# Patient Record
Sex: MALE | Race: WHITE | NOT HISPANIC OR LATINO | URBAN - METROPOLITAN AREA
[De-identification: names, ages, dates, MRNs, and addresses within clinical notes are randomized per-mention and may not be internally consistent; named-entity substitution may affect disease eponyms.]

---

## 2018-04-11 ENCOUNTER — EMERGENCY (EMERGENCY)
Facility: HOSPITAL | Age: 38
LOS: 1 days | Discharge: HOME | End: 2018-04-11
Attending: EMERGENCY MEDICINE | Admitting: EMERGENCY MEDICINE
Payer: COMMERCIAL

## 2018-04-11 ENCOUNTER — RESULT REVIEW (OUTPATIENT)
Age: 38
End: 2018-04-11

## 2018-04-11 ENCOUNTER — INPATIENT (INPATIENT)
Facility: HOSPITAL | Age: 38
LOS: 10 days | Discharge: HOME | DRG: 219 | End: 2018-04-22
Attending: THORACIC SURGERY (CARDIOTHORACIC VASCULAR SURGERY) | Admitting: THORACIC SURGERY (CARDIOTHORACIC VASCULAR SURGERY)
Payer: COMMERCIAL

## 2018-04-11 ENCOUNTER — APPOINTMENT (OUTPATIENT)
Dept: CARDIOTHORACIC SURGERY | Facility: HOSPITAL | Age: 38
End: 2018-04-11

## 2018-04-11 VITALS
HEART RATE: 75 BPM | SYSTOLIC BLOOD PRESSURE: 149 MMHG | DIASTOLIC BLOOD PRESSURE: 83 MMHG | WEIGHT: 209.44 LBS | RESPIRATION RATE: 44 BRPM | HEIGHT: 70 IN | OXYGEN SATURATION: 94 %

## 2018-04-11 VITALS
RESPIRATION RATE: 18 BRPM | SYSTOLIC BLOOD PRESSURE: 104 MMHG | TEMPERATURE: 99 F | DIASTOLIC BLOOD PRESSURE: 51 MMHG | OXYGEN SATURATION: 99 % | HEART RATE: 63 BPM

## 2018-04-11 VITALS
HEART RATE: 70 BPM | RESPIRATION RATE: 18 BRPM | SYSTOLIC BLOOD PRESSURE: 135 MMHG | TEMPERATURE: 98 F | OXYGEN SATURATION: 100 %

## 2018-04-11 DIAGNOSIS — I72.9 ANEURYSM OF UNSPECIFIED SITE: ICD-10-CM

## 2018-04-11 DIAGNOSIS — I71.03 DISSECTION OF THORACOABDOMINAL AORTA: ICD-10-CM

## 2018-04-11 PROBLEM — Z00.00 ENCOUNTER FOR PREVENTIVE HEALTH EXAMINATION: Status: ACTIVE | Noted: 2018-04-11

## 2018-04-11 LAB
ALBUMIN SERPL ELPH-MCNC: 3.8 G/DL — SIGNIFICANT CHANGE UP (ref 3.3–5)
ALBUMIN SERPL ELPH-MCNC: 4.1 G/DL — SIGNIFICANT CHANGE UP (ref 3.3–5)
ALBUMIN SERPL ELPH-MCNC: 4.2 G/DL — SIGNIFICANT CHANGE UP (ref 3.3–5)
ALP SERPL-CCNC: 26 U/L — LOW (ref 40–120)
ALP SERPL-CCNC: 37 U/L — LOW (ref 40–120)
ALP SERPL-CCNC: 39 U/L — LOW (ref 40–120)
ALT FLD-CCNC: 22 U/L RC — SIGNIFICANT CHANGE UP (ref 10–45)
ALT FLD-CCNC: 28 U/L RC — SIGNIFICANT CHANGE UP (ref 10–45)
ALT FLD-CCNC: 31 U/L — SIGNIFICANT CHANGE UP (ref 4–41)
AMYLASE P1 CFR SERPL: 84 U/L — SIGNIFICANT CHANGE UP (ref 25–125)
ANION GAP SERPL CALC-SCNC: 17 MMOL/L — SIGNIFICANT CHANGE UP (ref 5–17)
ANION GAP SERPL CALC-SCNC: 18 MMOL/L — HIGH (ref 5–17)
APAP SERPL-MCNC: < 15 UG/ML — LOW (ref 15–25)
APTT BLD: 29 SEC — SIGNIFICANT CHANGE UP (ref 27.5–37.4)
APTT BLD: 30.3 SEC — SIGNIFICANT CHANGE UP (ref 27.5–37.4)
APTT BLD: 30.5 SEC — SIGNIFICANT CHANGE UP (ref 27.5–37.4)
AST SERPL-CCNC: 24 U/L — SIGNIFICANT CHANGE UP (ref 10–40)
AST SERPL-CCNC: 26 U/L — SIGNIFICANT CHANGE UP (ref 4–40)
AST SERPL-CCNC: 55 U/L — HIGH (ref 10–40)
BASE EXCESS BLDV CALC-SCNC: -4.5 MMOL/L — LOW (ref -2–2)
BASE EXCESS BLDV CALC-SCNC: 2.8 MMOL/L — SIGNIFICANT CHANGE UP
BASOPHILS # BLD AUTO: 0 K/UL — SIGNIFICANT CHANGE UP (ref 0–0.2)
BASOPHILS # BLD AUTO: 0.07 K/UL — SIGNIFICANT CHANGE UP (ref 0–0.2)
BASOPHILS NFR BLD AUTO: 0.2 % — SIGNIFICANT CHANGE UP (ref 0–2)
BASOPHILS NFR BLD AUTO: 0.4 % — SIGNIFICANT CHANGE UP (ref 0–2)
BILIRUB SERPL-MCNC: 0.3 MG/DL — SIGNIFICANT CHANGE UP (ref 0.2–1.2)
BILIRUB SERPL-MCNC: 0.4 MG/DL — SIGNIFICANT CHANGE UP (ref 0.2–1.2)
BILIRUB SERPL-MCNC: 0.8 MG/DL — SIGNIFICANT CHANGE UP (ref 0.2–1.2)
BLD GP AB SCN SERPL QL: NEGATIVE — SIGNIFICANT CHANGE UP
BLD GP AB SCN SERPL QL: NEGATIVE — SIGNIFICANT CHANGE UP
BLOOD GAS VENOUS - CREATININE: 1.09 MG/DL — SIGNIFICANT CHANGE UP (ref 0.5–1.3)
BLOOD GAS VENOUS - CREATININE: SIGNIFICANT CHANGE UP MG/DL (ref 0.5–1.3)
BUN SERPL-MCNC: 15 MG/DL — SIGNIFICANT CHANGE UP (ref 7–23)
BUN SERPL-MCNC: 17 MG/DL — SIGNIFICANT CHANGE UP (ref 7–23)
BUN SERPL-MCNC: 17 MG/DL — SIGNIFICANT CHANGE UP (ref 7–23)
CA-I SERPL-SCNC: 0.8 MMOL/L — LOW (ref 1.12–1.3)
CALCIUM SERPL-MCNC: 7.5 MG/DL — LOW (ref 8.4–10.5)
CALCIUM SERPL-MCNC: 8.8 MG/DL — SIGNIFICANT CHANGE UP (ref 8.4–10.5)
CALCIUM SERPL-MCNC: 9.1 MG/DL — SIGNIFICANT CHANGE UP (ref 8.4–10.5)
CHLORIDE BLDV-SCNC: 105 MMOL/L — SIGNIFICANT CHANGE UP (ref 96–108)
CHLORIDE BLDV-SCNC: SIGNIFICANT CHANGE UP MMOL/L (ref 96–108)
CHLORIDE SERPL-SCNC: 101 MMOL/L — SIGNIFICANT CHANGE UP (ref 96–108)
CHLORIDE SERPL-SCNC: 104 MMOL/L — SIGNIFICANT CHANGE UP (ref 96–108)
CHLORIDE SERPL-SCNC: 98 MMOL/L — SIGNIFICANT CHANGE UP (ref 98–107)
CK MB BLD-MCNC: 9.9 % — HIGH (ref 0–3.5)
CK MB CFR SERPL CALC: 25.1 NG/ML — HIGH (ref 0–6.7)
CK SERPL-CCNC: 254 U/L — HIGH (ref 30–200)
CO2 SERPL-SCNC: 21 MMOL/L — LOW (ref 22–31)
CO2 SERPL-SCNC: 22 MMOL/L — SIGNIFICANT CHANGE UP (ref 22–31)
CO2 SERPL-SCNC: 23 MMOL/L — SIGNIFICANT CHANGE UP (ref 22–31)
CREAT SERPL-MCNC: 0.92 MG/DL — SIGNIFICANT CHANGE UP (ref 0.5–1.3)
CREAT SERPL-MCNC: 0.99 MG/DL — SIGNIFICANT CHANGE UP (ref 0.5–1.3)
CREAT SERPL-MCNC: 1.13 MG/DL — SIGNIFICANT CHANGE UP (ref 0.5–1.3)
EOSINOPHIL # BLD AUTO: 0 K/UL — SIGNIFICANT CHANGE UP (ref 0–0.5)
EOSINOPHIL # BLD AUTO: 0.18 K/UL — SIGNIFICANT CHANGE UP (ref 0–0.5)
EOSINOPHIL NFR BLD AUTO: 0.3 % — SIGNIFICANT CHANGE UP (ref 0–6)
EOSINOPHIL NFR BLD AUTO: 1.1 % — SIGNIFICANT CHANGE UP (ref 0–6)
ETHANOL BLD-MCNC: < 10 MG/DL — SIGNIFICANT CHANGE UP
FIBRINOGEN PPP-MCNC: 279 MG/DL — LOW (ref 310–510)
GAS PNL BLDA: SIGNIFICANT CHANGE UP
GAS PNL BLDV: 136 MMOL/L — SIGNIFICANT CHANGE UP (ref 136–146)
GAS PNL BLDV: 140 MMOL/L — SIGNIFICANT CHANGE UP (ref 136–145)
GAS PNL BLDV: SIGNIFICANT CHANGE UP
GAS PNL BLDV: SIGNIFICANT CHANGE UP
GLUCOSE BLDV-MCNC: 170 — HIGH (ref 70–99)
GLUCOSE BLDV-MCNC: 83 MG/DL — SIGNIFICANT CHANGE UP (ref 70–99)
GLUCOSE SERPL-MCNC: 144 MG/DL — HIGH (ref 70–99)
GLUCOSE SERPL-MCNC: 161 MG/DL — HIGH (ref 70–99)
GLUCOSE SERPL-MCNC: 170 MG/DL — HIGH (ref 70–99)
HCO3 BLDV-SCNC: 20 MMOL/L — LOW (ref 21–29)
HCO3 BLDV-SCNC: 26 MMOL/L — SIGNIFICANT CHANGE UP (ref 20–27)
HCT VFR BLD CALC: 30.4 % — LOW (ref 39–50)
HCT VFR BLD CALC: 41.1 % — SIGNIFICANT CHANGE UP (ref 39–50)
HCT VFR BLD CALC: 41.7 % — SIGNIFICANT CHANGE UP (ref 39–50)
HCT VFR BLDA CALC: 32 % — LOW (ref 39–50)
HCT VFR BLDV CALC: 46.3 % — SIGNIFICANT CHANGE UP (ref 39–51)
HGB BLD CALC-MCNC: 10.2 G/DL — LOW (ref 13–17)
HGB BLD-MCNC: 10.9 G/DL — LOW (ref 13–17)
HGB BLD-MCNC: 14.6 G/DL — SIGNIFICANT CHANGE UP (ref 13–17)
HGB BLD-MCNC: 15 G/DL — SIGNIFICANT CHANGE UP (ref 13–17)
HGB BLDV-MCNC: 15.1 G/DL — SIGNIFICANT CHANGE UP (ref 13–17)
HOROWITZ INDEX BLDV+IHG-RTO: 0 — SIGNIFICANT CHANGE UP
IMM GRANULOCYTES # BLD AUTO: 0.09 # — SIGNIFICANT CHANGE UP
IMM GRANULOCYTES NFR BLD AUTO: 0.5 % — SIGNIFICANT CHANGE UP (ref 0–1.5)
INR BLD: 1.11 — SIGNIFICANT CHANGE UP (ref 0.88–1.17)
INR BLD: 1.14 RATIO — SIGNIFICANT CHANGE UP (ref 0.88–1.16)
INR BLD: 1.15 RATIO — SIGNIFICANT CHANGE UP (ref 0.88–1.16)
LACTATE BLDV-MCNC: 3.4 MMOL/L — HIGH (ref 0.7–2)
LACTATE BLDV-MCNC: 3.7 MMOL/L — HIGH (ref 0.5–2)
LIDOCAIN IGE QN: 36 U/L — SIGNIFICANT CHANGE UP (ref 7–60)
LYMPHOCYTES # BLD AUTO: 1 K/UL — SIGNIFICANT CHANGE UP (ref 1–3.3)
LYMPHOCYTES # BLD AUTO: 22.8 % — SIGNIFICANT CHANGE UP (ref 13–44)
LYMPHOCYTES # BLD AUTO: 24.9 % — SIGNIFICANT CHANGE UP (ref 13–44)
LYMPHOCYTES # BLD AUTO: 4.11 K/UL — HIGH (ref 1–3.3)
MCHC RBC-ENTMCNC: 31.8 PG — SIGNIFICANT CHANGE UP (ref 27–34)
MCHC RBC-ENTMCNC: 32.6 PG — SIGNIFICANT CHANGE UP (ref 27–34)
MCHC RBC-ENTMCNC: 33.1 PG — SIGNIFICANT CHANGE UP (ref 27–34)
MCHC RBC-ENTMCNC: 35.5 GM/DL — SIGNIFICANT CHANGE UP (ref 32–36)
MCHC RBC-ENTMCNC: 35.8 GM/DL — SIGNIFICANT CHANGE UP (ref 32–36)
MCHC RBC-ENTMCNC: 36 % — SIGNIFICANT CHANGE UP (ref 32–36)
MCV RBC AUTO: 88.5 FL — SIGNIFICANT CHANGE UP (ref 80–100)
MCV RBC AUTO: 91.8 FL — SIGNIFICANT CHANGE UP (ref 80–100)
MCV RBC AUTO: 92.5 FL — SIGNIFICANT CHANGE UP (ref 80–100)
MONOCYTES # BLD AUTO: 0.1 K/UL — SIGNIFICANT CHANGE UP (ref 0–0.9)
MONOCYTES # BLD AUTO: 1.16 K/UL — HIGH (ref 0–0.9)
MONOCYTES NFR BLD AUTO: 2.3 % — SIGNIFICANT CHANGE UP (ref 2–14)
MONOCYTES NFR BLD AUTO: 7 % — SIGNIFICANT CHANGE UP (ref 2–14)
NEUTROPHILS # BLD AUTO: 10.91 K/UL — HIGH (ref 1.8–7.4)
NEUTROPHILS # BLD AUTO: 3.1 K/UL — SIGNIFICANT CHANGE UP (ref 1.8–7.4)
NEUTROPHILS NFR BLD AUTO: 66.1 % — SIGNIFICANT CHANGE UP (ref 43–77)
NEUTROPHILS NFR BLD AUTO: 74.3 % — SIGNIFICANT CHANGE UP (ref 43–77)
NRBC # FLD: 0 — SIGNIFICANT CHANGE UP
PCO2 BLDV: 37 MMHG — SIGNIFICANT CHANGE UP (ref 35–50)
PCO2 BLDV: 38 MMHG — LOW (ref 41–51)
PH BLDV: 7.35 — SIGNIFICANT CHANGE UP (ref 7.35–7.45)
PH BLDV: 7.46 PH — HIGH (ref 7.32–7.43)
PLATELET # BLD AUTO: 134 K/UL — LOW (ref 150–400)
PLATELET # BLD AUTO: 156 K/UL — SIGNIFICANT CHANGE UP (ref 150–400)
PLATELET # BLD AUTO: 181 K/UL — SIGNIFICANT CHANGE UP (ref 150–400)
PMV BLD: 9.5 FL — SIGNIFICANT CHANGE UP (ref 7–13)
PO2 BLDV: 33 MMHG — LOW (ref 35–40)
PO2 BLDV: 62 MMHG — HIGH (ref 25–45)
POTASSIUM BLDV-SCNC: 3 MMOL/L — LOW (ref 3.4–4.5)
POTASSIUM BLDV-SCNC: 5.2 MMOL/L — HIGH (ref 3.5–5)
POTASSIUM SERPL-MCNC: 3.3 MMOL/L — LOW (ref 3.5–5.3)
POTASSIUM SERPL-MCNC: 3.8 MMOL/L — SIGNIFICANT CHANGE UP (ref 3.5–5.3)
POTASSIUM SERPL-MCNC: 4.4 MMOL/L — SIGNIFICANT CHANGE UP (ref 3.5–5.3)
POTASSIUM SERPL-SCNC: 3.3 MMOL/L — LOW (ref 3.5–5.3)
POTASSIUM SERPL-SCNC: 3.8 MMOL/L — SIGNIFICANT CHANGE UP (ref 3.5–5.3)
POTASSIUM SERPL-SCNC: 4.4 MMOL/L — SIGNIFICANT CHANGE UP (ref 3.5–5.3)
PROT SERPL-MCNC: 5.6 G/DL — LOW (ref 6–8.3)
PROT SERPL-MCNC: 6.5 G/DL — SIGNIFICANT CHANGE UP (ref 6–8.3)
PROT SERPL-MCNC: 6.7 G/DL — SIGNIFICANT CHANGE UP (ref 6–8.3)
PROTHROM AB SERPL-ACNC: 12.3 SEC — SIGNIFICANT CHANGE UP (ref 9.8–13.1)
PROTHROM AB SERPL-ACNC: 12.5 SEC — SIGNIFICANT CHANGE UP (ref 9.8–12.7)
PROTHROM AB SERPL-ACNC: 12.5 SEC — SIGNIFICANT CHANGE UP (ref 9.8–12.7)
RBC # BLD: 3.29 M/UL — LOW (ref 4.2–5.8)
RBC # BLD: 4.47 M/UL — SIGNIFICANT CHANGE UP (ref 4.2–5.8)
RBC # BLD: 4.71 M/UL — SIGNIFICANT CHANGE UP (ref 4.2–5.8)
RBC # FLD: 11.7 % — SIGNIFICANT CHANGE UP (ref 10.3–14.5)
RBC # FLD: 11.8 % — SIGNIFICANT CHANGE UP (ref 10.3–14.5)
RBC # FLD: 12.1 % — SIGNIFICANT CHANGE UP (ref 10.3–14.5)
RH IG SCN BLD-IMP: POSITIVE — SIGNIFICANT CHANGE UP
RH IG SCN BLD-IMP: POSITIVE — SIGNIFICANT CHANGE UP
SALICYLATES SERPL-MCNC: < 5 MG/DL — LOW (ref 15–30)
SAO2 % BLDV: 65.8 % — SIGNIFICANT CHANGE UP (ref 60–85)
SAO2 % BLDV: 90 % — HIGH (ref 67–88)
SODIUM SERPL-SCNC: 139 MMOL/L — SIGNIFICANT CHANGE UP (ref 135–145)
SODIUM SERPL-SCNC: 139 MMOL/L — SIGNIFICANT CHANGE UP (ref 135–145)
SODIUM SERPL-SCNC: 144 MMOL/L — SIGNIFICANT CHANGE UP (ref 135–145)
TROPONIN T SERPL-MCNC: 0.75 NG/ML — HIGH (ref 0–0.06)
TROPONIN T SERPL-MCNC: < 0.06 NG/ML — SIGNIFICANT CHANGE UP (ref 0–0.06)
WBC # BLD: 14.9 K/UL — HIGH (ref 3.8–10.5)
WBC # BLD: 16.52 K/UL — HIGH (ref 3.8–10.5)
WBC # BLD: 4.2 K/UL — SIGNIFICANT CHANGE UP (ref 3.8–10.5)
WBC # FLD AUTO: 14.9 K/UL — HIGH (ref 3.8–10.5)
WBC # FLD AUTO: 16.52 K/UL — HIGH (ref 3.8–10.5)
WBC # FLD AUTO: 4.2 K/UL — SIGNIFICANT CHANGE UP (ref 3.8–10.5)

## 2018-04-11 PROCEDURE — 36620 INSERTION CATHETER ARTERY: CPT

## 2018-04-11 PROCEDURE — 88305 TISSUE EXAM BY PATHOLOGIST: CPT | Mod: 26

## 2018-04-11 PROCEDURE — 88311 DECALCIFY TISSUE: CPT | Mod: 26

## 2018-04-11 PROCEDURE — 99285 EMERGENCY DEPT VISIT HI MDM: CPT | Mod: 25

## 2018-04-11 PROCEDURE — 71045 X-RAY EXAM CHEST 1 VIEW: CPT | Mod: 26,77

## 2018-04-11 PROCEDURE — 93010 ELECTROCARDIOGRAM REPORT: CPT

## 2018-04-11 PROCEDURE — 71275 CT ANGIOGRAPHY CHEST: CPT | Mod: 26

## 2018-04-11 PROCEDURE — 71045 X-RAY EXAM CHEST 1 VIEW: CPT | Mod: 26

## 2018-04-11 PROCEDURE — 33863 ASCENDING AORTIC GRAFT: CPT | Mod: AS

## 2018-04-11 PROCEDURE — 74174 CTA ABD&PLVS W/CONTRAST: CPT | Mod: 26

## 2018-04-11 PROCEDURE — 99053 MED SERV 10PM-8AM 24 HR FAC: CPT

## 2018-04-11 PROCEDURE — 33863 ASCENDING AORTIC GRAFT: CPT

## 2018-04-11 RX ORDER — SODIUM CHLORIDE 9 MG/ML
500 INJECTION, SOLUTION INTRAVENOUS ONCE
Qty: 0 | Refills: 0 | Status: COMPLETED | OUTPATIENT
Start: 2018-04-11 | End: 2018-04-11

## 2018-04-11 RX ORDER — POTASSIUM CHLORIDE 20 MEQ
10 PACKET (EA) ORAL
Qty: 0 | Refills: 0 | Status: COMPLETED | OUTPATIENT
Start: 2018-04-11 | End: 2018-04-11

## 2018-04-11 RX ORDER — HYDROMORPHONE HYDROCHLORIDE 2 MG/ML
1 INJECTION INTRAMUSCULAR; INTRAVENOUS; SUBCUTANEOUS ONCE
Qty: 0 | Refills: 0 | Status: DISCONTINUED | OUTPATIENT
Start: 2018-04-11 | End: 2018-04-11

## 2018-04-11 RX ORDER — FAMOTIDINE 10 MG/ML
20 INJECTION INTRAVENOUS DAILY
Qty: 0 | Refills: 0 | Status: DISCONTINUED | OUTPATIENT
Start: 2018-04-11 | End: 2018-04-15

## 2018-04-11 RX ORDER — SODIUM CHLORIDE 9 MG/ML
1000 INJECTION INTRAMUSCULAR; INTRAVENOUS; SUBCUTANEOUS
Qty: 0 | Refills: 0 | Status: DISCONTINUED | OUTPATIENT
Start: 2018-04-11 | End: 2018-04-13

## 2018-04-11 RX ORDER — METOCLOPRAMIDE HCL 10 MG
10 TABLET ORAL EVERY 8 HOURS
Qty: 0 | Refills: 0 | Status: COMPLETED | OUTPATIENT
Start: 2018-04-11 | End: 2018-04-13

## 2018-04-11 RX ORDER — POTASSIUM CHLORIDE 20 MEQ
10 PACKET (EA) ORAL ONCE
Qty: 0 | Refills: 0 | Status: COMPLETED | OUTPATIENT
Start: 2018-04-11 | End: 2018-04-11

## 2018-04-11 RX ORDER — KETOROLAC TROMETHAMINE 30 MG/ML
15 SYRINGE (ML) INJECTION EVERY 8 HOURS
Qty: 0 | Refills: 0 | Status: DISCONTINUED | OUTPATIENT
Start: 2018-04-11 | End: 2018-04-12

## 2018-04-11 RX ORDER — LABETALOL HCL 100 MG
2 TABLET ORAL
Qty: 200 | Refills: 0 | Status: DISCONTINUED | OUTPATIENT
Start: 2018-04-11 | End: 2018-04-11

## 2018-04-11 RX ORDER — DOCUSATE SODIUM 100 MG
100 CAPSULE ORAL THREE TIMES A DAY
Qty: 0 | Refills: 0 | Status: DISCONTINUED | OUTPATIENT
Start: 2018-04-11 | End: 2018-04-22

## 2018-04-11 RX ORDER — SODIUM CHLORIDE 9 MG/ML
1000 INJECTION, SOLUTION INTRAVENOUS
Qty: 0 | Refills: 0 | Status: DISCONTINUED | OUTPATIENT
Start: 2018-04-11 | End: 2018-04-15

## 2018-04-11 RX ORDER — ACETAMINOPHEN 500 MG
1000 TABLET ORAL ONCE
Qty: 0 | Refills: 0 | Status: COMPLETED | OUTPATIENT
Start: 2018-04-11 | End: 2018-04-11

## 2018-04-11 RX ORDER — MILRINONE LACTATE 1 MG/ML
0.2 INJECTION, SOLUTION INTRAVENOUS
Qty: 20 | Refills: 0 | Status: DISCONTINUED | OUTPATIENT
Start: 2018-04-11 | End: 2018-04-13

## 2018-04-11 RX ORDER — INSULIN HUMAN 100 [IU]/ML
3 INJECTION, SOLUTION SUBCUTANEOUS
Qty: 100 | Refills: 0 | Status: DISCONTINUED | OUTPATIENT
Start: 2018-04-11 | End: 2018-04-12

## 2018-04-11 RX ORDER — CEFUROXIME AXETIL 250 MG
1500 TABLET ORAL EVERY 8 HOURS
Qty: 0 | Refills: 0 | Status: COMPLETED | OUTPATIENT
Start: 2018-04-11 | End: 2018-04-12

## 2018-04-11 RX ORDER — GLUCAGON INJECTION, SOLUTION 0.5 MG/.1ML
1 INJECTION, SOLUTION SUBCUTANEOUS ONCE
Qty: 0 | Refills: 0 | Status: DISCONTINUED | OUTPATIENT
Start: 2018-04-11 | End: 2018-04-15

## 2018-04-11 RX ORDER — DEXTROSE 50 % IN WATER 50 %
25 SYRINGE (ML) INTRAVENOUS ONCE
Qty: 0 | Refills: 0 | Status: DISCONTINUED | OUTPATIENT
Start: 2018-04-11 | End: 2018-04-15

## 2018-04-11 RX ORDER — NICARDIPINE HYDROCHLORIDE 30 MG/1
3 CAPSULE, EXTENDED RELEASE ORAL
Qty: 40 | Refills: 0 | Status: DISCONTINUED | OUTPATIENT
Start: 2018-04-11 | End: 2018-04-12

## 2018-04-11 RX ORDER — MORPHINE SULFATE 50 MG/1
4 CAPSULE, EXTENDED RELEASE ORAL ONCE
Qty: 0 | Refills: 0 | Status: DISCONTINUED | OUTPATIENT
Start: 2018-04-11 | End: 2018-04-11

## 2018-04-11 RX ORDER — VASOPRESSIN 20 [USP'U]/ML
0.1 INJECTION INTRAVENOUS
Qty: 100 | Refills: 0 | Status: DISCONTINUED | OUTPATIENT
Start: 2018-04-11 | End: 2018-04-12

## 2018-04-11 RX ORDER — PANTOPRAZOLE SODIUM 20 MG/1
40 TABLET, DELAYED RELEASE ORAL DAILY
Qty: 0 | Refills: 0 | Status: DISCONTINUED | OUTPATIENT
Start: 2018-04-11 | End: 2018-04-13

## 2018-04-11 RX ORDER — DEXTROSE 50 % IN WATER 50 %
12.5 SYRINGE (ML) INTRAVENOUS ONCE
Qty: 0 | Refills: 0 | Status: DISCONTINUED | OUTPATIENT
Start: 2018-04-11 | End: 2018-04-15

## 2018-04-11 RX ORDER — DEXTROSE 50 % IN WATER 50 %
1 SYRINGE (ML) INTRAVENOUS ONCE
Qty: 0 | Refills: 0 | Status: DISCONTINUED | OUTPATIENT
Start: 2018-04-11 | End: 2018-04-15

## 2018-04-11 RX ORDER — DEXMEDETOMIDINE HYDROCHLORIDE IN 0.9% SODIUM CHLORIDE 4 UG/ML
0.7 INJECTION INTRAVENOUS
Qty: 200 | Refills: 0 | Status: DISCONTINUED | OUTPATIENT
Start: 2018-04-11 | End: 2018-04-12

## 2018-04-11 RX ORDER — VECURONIUM BROMIDE 20 MG/1
10 INJECTION, POWDER, FOR SOLUTION INTRAVENOUS ONCE
Qty: 0 | Refills: 0 | Status: COMPLETED | OUTPATIENT
Start: 2018-04-11 | End: 2018-04-11

## 2018-04-11 RX ORDER — DOBUTAMINE HCL 250MG/20ML
2.5 VIAL (ML) INTRAVENOUS
Qty: 500 | Refills: 0 | Status: DISCONTINUED | OUTPATIENT
Start: 2018-04-11 | End: 2018-04-12

## 2018-04-11 RX ORDER — ESMOLOL HCL 100MG/10ML
50 VIAL (ML) INTRAVENOUS
Qty: 2500 | Refills: 0 | Status: DISCONTINUED | OUTPATIENT
Start: 2018-04-11 | End: 2018-04-15

## 2018-04-11 RX ORDER — NITROGLYCERIN 6.5 MG
0.4 CAPSULE, EXTENDED RELEASE ORAL ONCE
Qty: 0 | Refills: 0 | Status: COMPLETED | OUTPATIENT
Start: 2018-04-11 | End: 2018-04-11

## 2018-04-11 RX ORDER — MEPERIDINE HYDROCHLORIDE 50 MG/ML
25 INJECTION INTRAMUSCULAR; INTRAVENOUS; SUBCUTANEOUS ONCE
Qty: 0 | Refills: 0 | Status: DISCONTINUED | OUTPATIENT
Start: 2018-04-11 | End: 2018-04-12

## 2018-04-11 RX ORDER — PROPOFOL 10 MG/ML
20 INJECTION, EMULSION INTRAVENOUS
Qty: 1000 | Refills: 0 | Status: DISCONTINUED | OUTPATIENT
Start: 2018-04-11 | End: 2018-04-12

## 2018-04-11 RX ORDER — AMINOCAPROIC ACID 500 MG/1
5 TABLET ORAL
Qty: 5 | Refills: 0 | Status: DISCONTINUED | OUTPATIENT
Start: 2018-04-11 | End: 2018-04-12

## 2018-04-11 RX ORDER — HYDROMORPHONE HYDROCHLORIDE 2 MG/ML
0.5 INJECTION INTRAMUSCULAR; INTRAVENOUS; SUBCUTANEOUS ONCE
Qty: 0 | Refills: 0 | Status: DISCONTINUED | OUTPATIENT
Start: 2018-04-11 | End: 2018-04-11

## 2018-04-11 RX ORDER — HYDROMORPHONE HYDROCHLORIDE 2 MG/ML
2 INJECTION INTRAMUSCULAR; INTRAVENOUS; SUBCUTANEOUS ONCE
Qty: 0 | Refills: 0 | Status: DISCONTINUED | OUTPATIENT
Start: 2018-04-11 | End: 2018-04-11

## 2018-04-11 RX ORDER — SODIUM CHLORIDE 9 MG/ML
1000 INJECTION INTRAMUSCULAR; INTRAVENOUS; SUBCUTANEOUS ONCE
Qty: 0 | Refills: 0 | Status: COMPLETED | OUTPATIENT
Start: 2018-04-11 | End: 2018-04-11

## 2018-04-11 RX ADMIN — Medication 28.58 MICROGRAM(S)/KG/MIN: at 03:20

## 2018-04-11 RX ADMIN — Medication 50 MILLIEQUIVALENT(S): at 16:17

## 2018-04-11 RX ADMIN — Medication 1000 MILLIGRAM(S): at 21:00

## 2018-04-11 RX ADMIN — Medication 100 MILLIGRAM(S): at 13:11

## 2018-04-11 RX ADMIN — MORPHINE SULFATE 4 MILLIGRAM(S): 50 CAPSULE, EXTENDED RELEASE ORAL at 02:33

## 2018-04-11 RX ADMIN — NICARDIPINE HYDROCHLORIDE 15 MG/HR: 30 CAPSULE, EXTENDED RELEASE ORAL at 14:36

## 2018-04-11 RX ADMIN — Medication 400 MILLIGRAM(S): at 20:40

## 2018-04-11 RX ADMIN — HYDROMORPHONE HYDROCHLORIDE 2 MILLIGRAM(S): 2 INJECTION INTRAMUSCULAR; INTRAVENOUS; SUBCUTANEOUS at 17:10

## 2018-04-11 RX ADMIN — Medication 10 MILLIGRAM(S): at 13:11

## 2018-04-11 RX ADMIN — Medication 100 MILLIEQUIVALENT(S): at 14:27

## 2018-04-11 RX ADMIN — MORPHINE SULFATE 4 MILLIGRAM(S): 50 CAPSULE, EXTENDED RELEASE ORAL at 01:25

## 2018-04-11 RX ADMIN — HYDROMORPHONE HYDROCHLORIDE 2 MILLIGRAM(S): 2 INJECTION INTRAMUSCULAR; INTRAVENOUS; SUBCUTANEOUS at 17:30

## 2018-04-11 RX ADMIN — VECURONIUM BROMIDE 10 MILLIGRAM(S): 20 INJECTION, POWDER, FOR SOLUTION INTRAVENOUS at 17:11

## 2018-04-11 RX ADMIN — FAMOTIDINE 20 MILLIGRAM(S): 10 INJECTION INTRAVENOUS at 01:25

## 2018-04-11 RX ADMIN — AMINOCAPROIC ACID 250 GM/HR: 500 TABLET ORAL at 13:12

## 2018-04-11 RX ADMIN — Medication 50 MILLIEQUIVALENT(S): at 15:55

## 2018-04-11 RX ADMIN — DEXMEDETOMIDINE HYDROCHLORIDE IN 0.9% SODIUM CHLORIDE 16.62 MICROGRAM(S)/KG/HR: 4 INJECTION INTRAVENOUS at 13:12

## 2018-04-11 RX ADMIN — SODIUM CHLORIDE 2000 MILLILITER(S): 9 INJECTION, SOLUTION INTRAVENOUS at 13:40

## 2018-04-11 RX ADMIN — INSULIN HUMAN 3 UNIT(S)/HR: 100 INJECTION, SOLUTION SUBCUTANEOUS at 14:36

## 2018-04-11 RX ADMIN — HYDROMORPHONE HYDROCHLORIDE 1 MILLIGRAM(S): 2 INJECTION INTRAMUSCULAR; INTRAVENOUS; SUBCUTANEOUS at 14:00

## 2018-04-11 RX ADMIN — HYDROMORPHONE HYDROCHLORIDE 1 MILLIGRAM(S): 2 INJECTION INTRAMUSCULAR; INTRAVENOUS; SUBCUTANEOUS at 14:15

## 2018-04-11 RX ADMIN — SODIUM CHLORIDE 1000 MILLILITER(S): 9 INJECTION INTRAMUSCULAR; INTRAVENOUS; SUBCUTANEOUS at 01:25

## 2018-04-11 RX ADMIN — Medication 100 MILLIGRAM(S): at 21:09

## 2018-04-11 RX ADMIN — Medication 15 MILLIGRAM(S): at 17:15

## 2018-04-11 RX ADMIN — PANTOPRAZOLE SODIUM 40 MILLIGRAM(S): 20 TABLET, DELAYED RELEASE ORAL at 14:31

## 2018-04-11 RX ADMIN — HYDROMORPHONE HYDROCHLORIDE 0.5 MILLIGRAM(S): 2 INJECTION INTRAMUSCULAR; INTRAVENOUS; SUBCUTANEOUS at 03:17

## 2018-04-11 RX ADMIN — Medication 0.4 MILLIGRAM(S): at 01:15

## 2018-04-11 RX ADMIN — Medication 50 MILLIEQUIVALENT(S): at 15:10

## 2018-04-11 RX ADMIN — Medication 21.38 MICROGRAM(S)/KG/MIN: at 13:12

## 2018-04-11 RX ADMIN — Medication 15 MILLIGRAM(S): at 17:30

## 2018-04-11 RX ADMIN — SODIUM CHLORIDE 2000 MILLILITER(S): 9 INJECTION, SOLUTION INTRAVENOUS at 15:56

## 2018-04-11 NOTE — ED PROVIDER NOTE - PROGRESS NOTE DETAILS
Type A & B dissection: Transfer to [facility] CTICU. Dr. Sigifredo LOCKHART. Patient leaving ED on esmolol ggt. Stable. Awake and alert. No pain. Transfer center confirms pt going to OR at [facility]. RICHY.

## 2018-04-11 NOTE — H&P ADULT - HISTORY OF PRESENT ILLNESS
As per [facility] EMR: 38yo male with pmh of HTN not on any meds anymore, current every day smoker presents with chest pain for 2 hours. Pt states 2 hours ago he was working out then took a hot shower and started to have severe chest pain, radiating down the abdomen with nausea and diaphoresis. Pt states worse with exertion. Pt denies v/d, urinary symptoms, loc, recent travel and sickness. Denies cocaine use endorses marijuana use. Pt denies family ho of cardiac disease.  On CTA, found to have: Ascending aortic aneurysm measuring 6 cm. 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 There is   extension into the brachiocephalic artery with nonopacification of the right common carotid artery concerning for acute occlusion. There is also extension into the proximal portion of the left subclavian artery. Dissection extends down into the descending aorta and ends at the level of the renal arteries. There is extension of the dissection into the celiac and superior mesenteric artery. 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 The inferior mesenteric artery is patent.  Transferred to [facility] for emergent surgical repair.

## 2018-04-11 NOTE — ED PROVIDER NOTE - OBJECTIVE STATEMENT
38yo male with pmh of HTN not on any meds anymore, current every day smoker presents with chest pain for 2 hours. Pt states 2 hours ago he was working out then took a hot shower and started to have severe chest pain, radiating down the abdomen with nausea and diaphoresis. Pt states worse with exertion. Pt denies v/d, urinary symptoms, loc, recent travel and sickness. Denies cocaine use endorses marijuana use. Pt denies family ho of cardiac disease.

## 2018-04-11 NOTE — H&P ADULT - NSHPPHYSICALEXAM_GEN_ALL_CORE
General: NAD, lying supine in bed  Neuro: A&Ox3, no focal deficits.  Card: +S1, S2. RRR.  Pulm: CTA b/l.  Abd: soft, nontender, nondistended. +BS.  Ext: No LE edema, no calf tenderness. + DP pulses.

## 2018-04-11 NOTE — H&P ADULT - ASSESSMENT
36yo male pmh of HTN, current every day smoker presents to [facility] ED with chest pain radiating down the abdomen for 2 hours now found to have type A aortic dissection, presented to [facility] for emergent surgical repair.

## 2018-04-11 NOTE — ED ADULT NURSE REASSESSMENT NOTE - NS ED NURSE REASSESS COMMENT FT1
Pt found to have Dissection on CT scan. Pt found to have Dissection on CT scan. Rpt given to Renae FARNSWORTH at [facility] and transfer center. Pt started on esmolol drip. EMS at bedside for transfer. Will continue to monitor.

## 2018-04-11 NOTE — ED ADULT NURSE NOTE - OBJECTIVE STATEMENT
Received pt in spot 23. [id number]. Pt arrives to room pale and diaphoretic. EKG in progress. C/o chest pain radiating to abd while taking shower tonight after working out. Also c/o dizziness and sob. Pt placed on cardiac monitor, NSR. Dr. Melendrez at bedside for eval. 20G IV placed to right AC, labs sent. Rpt given to primary RN Vonda. Will continue to monitor.

## 2018-04-11 NOTE — ED ADULT TRIAGE NOTE - CHIEF COMPLAINT QUOTE
Ambulatory accompanied by friend, denies any medical Hx aside from HTN that resolved, complaining of non-radiating mid-sternal CP that started about one hour ago. Has had a cold for the passed few days and is medicating with OTC medications, last taken 12 hours ago. Patient is lethargic, pale, very diaphoretic, unable to obtain EKG in triage. Denies SOB, pain is constant and the same on inspiration/expiration. . Charge RN aware, will room patient immediately.

## 2018-04-11 NOTE — PROCEDURE NOTE - NSPOSTCAREGUIDE_GEN_A_CORE
Verbal/written post procedure instructions were given to patient/caregiver/Care for catheter as per unit/ICU protocols
Keep the cast/splint/dressing clean and dry/Verbal/written post procedure instructions were given to patient/caregiver/Instructed patient/caregiver to follow-up with primary care physician

## 2018-04-11 NOTE — BRIEF OPERATIVE NOTE - PROCEDURE
<<-----Click on this checkbox to enter Procedure [facility] procedure  04/11/2018    Active  [facility] [place] procedure  04/11/2018  Bio Bent[place]  AVR #27  w/ 34 hemoshield graft  Active  Cynthia Gomez

## 2018-04-11 NOTE — PROCEDURE NOTE - NSPROCDETAILS_GEN_ALL_CORE
location identified, draped/prepped, sterile technique used, needle inserted/introduced/hemostasis with direct pressure, dressing applied/ultrasound guidance/positive blood return obtained via catheter/sutured in place/connected to a pressurized flush line/Seldinger technique/all materials/supplies accounted for at end of procedure
location identified, draped/prepped, sterile technique used, needle inserted/introduced/hemostasis with direct pressure, dressing applied/Seldinger technique/sutured in place/ultrasound guidance/positive blood return obtained via catheter/connected to a pressurized flush line/all materials/supplies accounted for at end of procedure

## 2018-04-11 NOTE — ED PROVIDER NOTE - MEDICAL DECISION MAKING DETAILS
38yo male with pmh of HTN not on any meds anymore, current every day smoker presents with chest pain for 2 hours r/o dissection and ACS

## 2018-04-11 NOTE — ED PROVIDER NOTE - ATTENDING CONTRIBUTION TO CARE
I was physically present for the E/M service provided. I agree with above history, physical, and plan which I have reviewed and edited where appropriate. I was physically present for the key portions of the service provided.    37M significant h/o HTN and tobacco use p/w chest pain radiating into abdomen a/w diaphoresis and nausea starting several hour PTA. Heart score 2. EKG NSR with biphasic T-wave in anterior leads. CTA r/o dissection. Afebrile. Normotensive. Lungs CTA. Abdomen soft NTND. No LE swelling.

## 2018-04-12 LAB
ALBUMIN SERPL ELPH-MCNC: 3.2 G/DL — LOW (ref 3.3–5)
ALP SERPL-CCNC: 24 U/L — LOW (ref 40–120)
ALT FLD-CCNC: 28 U/L RC — SIGNIFICANT CHANGE UP (ref 10–45)
ANION GAP SERPL CALC-SCNC: 15 MMOL/L — SIGNIFICANT CHANGE UP (ref 5–17)
APTT BLD: 28.3 SEC — SIGNIFICANT CHANGE UP (ref 27.5–37.4)
AST SERPL-CCNC: 84 U/L — HIGH (ref 10–40)
BILIRUB SERPL-MCNC: 0.5 MG/DL — SIGNIFICANT CHANGE UP (ref 0.2–1.2)
BUN SERPL-MCNC: 22 MG/DL — SIGNIFICANT CHANGE UP (ref 7–23)
CALCIUM SERPL-MCNC: 7.1 MG/DL — LOW (ref 8.4–10.5)
CHLORIDE SERPL-SCNC: 108 MMOL/L — SIGNIFICANT CHANGE UP (ref 96–108)
CO2 SERPL-SCNC: 21 MMOL/L — LOW (ref 22–31)
CREAT SERPL-MCNC: 1.24 MG/DL — SIGNIFICANT CHANGE UP (ref 0.5–1.3)
GAS PNL BLDA: SIGNIFICANT CHANGE UP
GLUCOSE SERPL-MCNC: 161 MG/DL — HIGH (ref 70–99)
HBA1C BLD-MCNC: 5.5 % — SIGNIFICANT CHANGE UP (ref 4–5.6)
HCT VFR BLD CALC: 30.6 % — LOW (ref 39–50)
HGB BLD-MCNC: 11 G/DL — LOW (ref 13–17)
INR BLD: 1.21 RATIO — HIGH (ref 0.88–1.16)
MAGNESIUM SERPL-MCNC: 2.5 MG/DL — SIGNIFICANT CHANGE UP (ref 1.6–2.6)
MCHC RBC-ENTMCNC: 33.7 PG — SIGNIFICANT CHANGE UP (ref 27–34)
MCHC RBC-ENTMCNC: 35.8 GM/DL — SIGNIFICANT CHANGE UP (ref 32–36)
MCV RBC AUTO: 94 FL — SIGNIFICANT CHANGE UP (ref 80–100)
PHOSPHATE SERPL-MCNC: 3.7 MG/DL — SIGNIFICANT CHANGE UP (ref 2.5–4.5)
PLATELET # BLD AUTO: 130 K/UL — LOW (ref 150–400)
POTASSIUM SERPL-MCNC: 5 MMOL/L — SIGNIFICANT CHANGE UP (ref 3.5–5.3)
POTASSIUM SERPL-SCNC: 5 MMOL/L — SIGNIFICANT CHANGE UP (ref 3.5–5.3)
PROT SERPL-MCNC: 5.3 G/DL — LOW (ref 6–8.3)
PROTHROM AB SERPL-ACNC: 13.1 SEC — HIGH (ref 9.8–12.7)
RBC # BLD: 3.25 M/UL — LOW (ref 4.2–5.8)
RBC # FLD: 11.9 % — SIGNIFICANT CHANGE UP (ref 10.3–14.5)
SODIUM SERPL-SCNC: 144 MMOL/L — SIGNIFICANT CHANGE UP (ref 135–145)
T3 SERPL-MCNC: 50 NG/DL — LOW (ref 80–200)
T4 AB SER-ACNC: 4.8 UG/DL — SIGNIFICANT CHANGE UP (ref 4.6–12)
TSH SERPL-MCNC: 1.43 UIU/ML — SIGNIFICANT CHANGE UP (ref 0.27–4.2)
WBC # BLD: 6.6 K/UL — SIGNIFICANT CHANGE UP (ref 3.8–10.5)
WBC # FLD AUTO: 6.6 K/UL — SIGNIFICANT CHANGE UP (ref 3.8–10.5)

## 2018-04-12 PROCEDURE — 93880 EXTRACRANIAL BILAT STUDY: CPT | Mod: 26

## 2018-04-12 PROCEDURE — 71045 X-RAY EXAM CHEST 1 VIEW: CPT | Mod: 26

## 2018-04-12 PROCEDURE — 93010 ELECTROCARDIOGRAM REPORT: CPT

## 2018-04-12 RX ORDER — HYDROMORPHONE HYDROCHLORIDE 2 MG/ML
0.5 INJECTION INTRAMUSCULAR; INTRAVENOUS; SUBCUTANEOUS
Qty: 0 | Refills: 0 | Status: DISCONTINUED | OUTPATIENT
Start: 2018-04-12 | End: 2018-04-16

## 2018-04-12 RX ORDER — ASPIRIN/CALCIUM CARB/MAGNESIUM 324 MG
325 TABLET ORAL DAILY
Qty: 0 | Refills: 0 | Status: DISCONTINUED | OUTPATIENT
Start: 2018-04-12 | End: 2018-04-22

## 2018-04-12 RX ORDER — HYDROMORPHONE HYDROCHLORIDE 2 MG/ML
1 INJECTION INTRAMUSCULAR; INTRAVENOUS; SUBCUTANEOUS ONCE
Qty: 0 | Refills: 0 | Status: DISCONTINUED | OUTPATIENT
Start: 2018-04-12 | End: 2018-04-12

## 2018-04-12 RX ORDER — POTASSIUM CHLORIDE 20 MEQ
10 PACKET (EA) ORAL
Qty: 0 | Refills: 0 | Status: DISCONTINUED | OUTPATIENT
Start: 2018-04-12 | End: 2018-04-12

## 2018-04-12 RX ORDER — INSULIN HUMAN 100 [IU]/ML
1 INJECTION, SOLUTION SUBCUTANEOUS
Qty: 100 | Refills: 0 | Status: DISCONTINUED | OUTPATIENT
Start: 2018-04-12 | End: 2018-04-13

## 2018-04-12 RX ORDER — NALOXONE HYDROCHLORIDE 4 MG/.1ML
0.1 SPRAY NASAL
Qty: 0 | Refills: 0 | Status: DISCONTINUED | OUTPATIENT
Start: 2018-04-12 | End: 2018-04-16

## 2018-04-12 RX ORDER — DOBUTAMINE HCL 250MG/20ML
1.25 VIAL (ML) INTRAVENOUS
Qty: 500 | Refills: 0 | Status: DISCONTINUED | OUTPATIENT
Start: 2018-04-12 | End: 2018-04-12

## 2018-04-12 RX ORDER — MAGNESIUM SULFATE 500 MG/ML
2 VIAL (ML) INJECTION ONCE
Qty: 0 | Refills: 0 | Status: COMPLETED | OUTPATIENT
Start: 2018-04-12 | End: 2018-04-12

## 2018-04-12 RX ORDER — NICARDIPINE HYDROCHLORIDE 30 MG/1
10 CAPSULE, EXTENDED RELEASE ORAL
Qty: 40 | Refills: 0 | Status: DISCONTINUED | OUTPATIENT
Start: 2018-04-12 | End: 2018-04-13

## 2018-04-12 RX ORDER — HYDROMORPHONE HYDROCHLORIDE 2 MG/ML
0.5 INJECTION INTRAMUSCULAR; INTRAVENOUS; SUBCUTANEOUS ONCE
Qty: 0 | Refills: 0 | Status: DISCONTINUED | OUTPATIENT
Start: 2018-04-12 | End: 2018-04-12

## 2018-04-12 RX ORDER — KETOROLAC TROMETHAMINE 30 MG/ML
15 SYRINGE (ML) INJECTION ONCE
Qty: 0 | Refills: 0 | Status: DISCONTINUED | OUTPATIENT
Start: 2018-04-12 | End: 2018-04-12

## 2018-04-12 RX ORDER — HYDROMORPHONE HYDROCHLORIDE 2 MG/ML
30 INJECTION INTRAMUSCULAR; INTRAVENOUS; SUBCUTANEOUS
Qty: 0 | Refills: 0 | Status: DISCONTINUED | OUTPATIENT
Start: 2018-04-12 | End: 2018-04-16

## 2018-04-12 RX ORDER — CALCIUM GLUCONATE 100 MG/ML
1 VIAL (ML) INTRAVENOUS ONCE
Qty: 0 | Refills: 0 | Status: COMPLETED | OUTPATIENT
Start: 2018-04-12 | End: 2018-04-12

## 2018-04-12 RX ORDER — ONDANSETRON 8 MG/1
4 TABLET, FILM COATED ORAL EVERY 6 HOURS
Qty: 0 | Refills: 0 | Status: DISCONTINUED | OUTPATIENT
Start: 2018-04-12 | End: 2018-04-16

## 2018-04-12 RX ORDER — KETOROLAC TROMETHAMINE 30 MG/ML
15 SYRINGE (ML) INJECTION EVERY 6 HOURS
Qty: 0 | Refills: 0 | Status: DISCONTINUED | OUTPATIENT
Start: 2018-04-12 | End: 2018-04-13

## 2018-04-12 RX ORDER — HYDRALAZINE HCL 50 MG
5 TABLET ORAL ONCE
Qty: 0 | Refills: 0 | Status: DISCONTINUED | OUTPATIENT
Start: 2018-04-12 | End: 2018-04-12

## 2018-04-12 RX ORDER — ACETAMINOPHEN 500 MG
1000 TABLET ORAL ONCE
Qty: 0 | Refills: 0 | Status: COMPLETED | OUTPATIENT
Start: 2018-04-12 | End: 2018-04-12

## 2018-04-12 RX ORDER — POTASSIUM CHLORIDE 20 MEQ
10 PACKET (EA) ORAL ONCE
Qty: 0 | Refills: 0 | Status: COMPLETED | OUTPATIENT
Start: 2018-04-12 | End: 2018-04-12

## 2018-04-12 RX ORDER — POTASSIUM CHLORIDE 20 MEQ
10 PACKET (EA) ORAL
Qty: 0 | Refills: 0 | Status: COMPLETED | OUTPATIENT
Start: 2018-04-12 | End: 2018-04-12

## 2018-04-12 RX ORDER — ENOXAPARIN SODIUM 100 MG/ML
40 INJECTION SUBCUTANEOUS DAILY
Qty: 0 | Refills: 0 | Status: DISCONTINUED | OUTPATIENT
Start: 2018-04-12 | End: 2018-04-12

## 2018-04-12 RX ORDER — INSULIN LISPRO 100/ML
VIAL (ML) SUBCUTANEOUS AT BEDTIME
Qty: 0 | Refills: 0 | Status: DISCONTINUED | OUTPATIENT
Start: 2018-04-12 | End: 2018-04-17

## 2018-04-12 RX ORDER — HYDRALAZINE HCL 50 MG
10 TABLET ORAL ONCE
Qty: 0 | Refills: 0 | Status: COMPLETED | OUTPATIENT
Start: 2018-04-12 | End: 2018-04-12

## 2018-04-12 RX ORDER — KETOROLAC TROMETHAMINE 30 MG/ML
30 SYRINGE (ML) INJECTION EVERY 6 HOURS
Qty: 0 | Refills: 0 | Status: DISCONTINUED | OUTPATIENT
Start: 2018-04-12 | End: 2018-04-12

## 2018-04-12 RX ORDER — INSULIN LISPRO 100/ML
VIAL (ML) SUBCUTANEOUS
Qty: 0 | Refills: 0 | Status: DISCONTINUED | OUTPATIENT
Start: 2018-04-12 | End: 2018-04-17

## 2018-04-12 RX ORDER — POTASSIUM CHLORIDE 20 MEQ
40 PACKET (EA) ORAL ONCE
Qty: 0 | Refills: 0 | Status: DISCONTINUED | OUTPATIENT
Start: 2018-04-12 | End: 2018-04-12

## 2018-04-12 RX ORDER — FUROSEMIDE 40 MG
20 TABLET ORAL ONCE
Qty: 0 | Refills: 0 | Status: COMPLETED | OUTPATIENT
Start: 2018-04-12 | End: 2018-04-12

## 2018-04-12 RX ORDER — ENOXAPARIN SODIUM 100 MG/ML
40 INJECTION SUBCUTANEOUS EVERY 12 HOURS
Qty: 0 | Refills: 0 | Status: DISCONTINUED | OUTPATIENT
Start: 2018-04-12 | End: 2018-04-13

## 2018-04-12 RX ORDER — HYDRALAZINE HCL 50 MG
10 TABLET ORAL ONCE
Qty: 0 | Refills: 0 | Status: DISCONTINUED | OUTPATIENT
Start: 2018-04-12 | End: 2018-04-13

## 2018-04-12 RX ORDER — IPRATROPIUM/ALBUTEROL SULFATE 18-103MCG
3 AEROSOL WITH ADAPTER (GRAM) INHALATION EVERY 6 HOURS
Qty: 0 | Refills: 0 | Status: DISCONTINUED | OUTPATIENT
Start: 2018-04-12 | End: 2018-04-22

## 2018-04-12 RX ADMIN — Medication 100 MILLIGRAM(S): at 13:56

## 2018-04-12 RX ADMIN — HYDROMORPHONE HYDROCHLORIDE 0.5 MILLIGRAM(S): 2 INJECTION INTRAMUSCULAR; INTRAVENOUS; SUBCUTANEOUS at 04:15

## 2018-04-12 RX ADMIN — Medication 3 MILLILITER(S): at 11:38

## 2018-04-12 RX ADMIN — Medication 100 MILLIGRAM(S): at 05:35

## 2018-04-12 RX ADMIN — Medication 20 MILLIGRAM(S): at 13:59

## 2018-04-12 RX ADMIN — Medication 1000 MILLIGRAM(S): at 05:45

## 2018-04-12 RX ADMIN — Medication 3 MILLILITER(S): at 23:32

## 2018-04-12 RX ADMIN — HYDROMORPHONE HYDROCHLORIDE 1 MILLIGRAM(S): 2 INJECTION INTRAMUSCULAR; INTRAVENOUS; SUBCUTANEOUS at 16:52

## 2018-04-12 RX ADMIN — VASOPRESSIN 6 UNIT(S)/MIN: 20 INJECTION INTRAVENOUS at 05:36

## 2018-04-12 RX ADMIN — PANTOPRAZOLE SODIUM 40 MILLIGRAM(S): 20 TABLET, DELAYED RELEASE ORAL at 12:14

## 2018-04-12 RX ADMIN — HYDROMORPHONE HYDROCHLORIDE 0.5 MILLIGRAM(S): 2 INJECTION INTRAMUSCULAR; INTRAVENOUS; SUBCUTANEOUS at 12:20

## 2018-04-12 RX ADMIN — Medication 400 MILLIGRAM(S): at 13:00

## 2018-04-12 RX ADMIN — Medication 50 GRAM(S): at 12:14

## 2018-04-12 RX ADMIN — Medication 15 MILLIGRAM(S): at 05:35

## 2018-04-12 RX ADMIN — Medication 10 MILLIGRAM(S): at 13:00

## 2018-04-12 RX ADMIN — ENOXAPARIN SODIUM 40 MILLIGRAM(S): 100 INJECTION SUBCUTANEOUS at 17:55

## 2018-04-12 RX ADMIN — HYDROMORPHONE HYDROCHLORIDE 1 MILLIGRAM(S): 2 INJECTION INTRAMUSCULAR; INTRAVENOUS; SUBCUTANEOUS at 19:30

## 2018-04-12 RX ADMIN — Medication 15 MILLIGRAM(S): at 14:56

## 2018-04-12 RX ADMIN — Medication 10 MILLIGRAM(S): at 22:08

## 2018-04-12 RX ADMIN — Medication 3 MILLILITER(S): at 06:26

## 2018-04-12 RX ADMIN — Medication 3.56 MICROGRAM(S)/KG/MIN: at 13:25

## 2018-04-12 RX ADMIN — HYDROMORPHONE HYDROCHLORIDE 0.5 MILLIGRAM(S): 2 INJECTION INTRAMUSCULAR; INTRAVENOUS; SUBCUTANEOUS at 08:00

## 2018-04-12 RX ADMIN — Medication 15 MILLIGRAM(S): at 14:11

## 2018-04-12 RX ADMIN — Medication 3 MILLILITER(S): at 17:42

## 2018-04-12 RX ADMIN — MILRINONE LACTATE 10.69 MICROGRAM(S)/KG/MIN: 1 INJECTION, SOLUTION INTRAVENOUS at 05:36

## 2018-04-12 RX ADMIN — HYDROMORPHONE HYDROCHLORIDE 1 MILLIGRAM(S): 2 INJECTION INTRAMUSCULAR; INTRAVENOUS; SUBCUTANEOUS at 19:45

## 2018-04-12 RX ADMIN — Medication 15 MILLIGRAM(S): at 05:30

## 2018-04-12 RX ADMIN — Medication 15 MILLIGRAM(S): at 13:56

## 2018-04-12 RX ADMIN — Medication 400 MILLIGRAM(S): at 05:36

## 2018-04-12 RX ADMIN — HYDROMORPHONE HYDROCHLORIDE 30 MILLILITER(S): 2 INJECTION INTRAMUSCULAR; INTRAVENOUS; SUBCUTANEOUS at 22:02

## 2018-04-12 RX ADMIN — NICARDIPINE HYDROCHLORIDE 50 MG/HR: 30 CAPSULE, EXTENDED RELEASE ORAL at 13:24

## 2018-04-12 RX ADMIN — Medication 100 MILLIGRAM(S): at 22:08

## 2018-04-12 RX ADMIN — Medication 50 MILLIEQUIVALENT(S): at 13:25

## 2018-04-12 RX ADMIN — Medication 10 MILLIGRAM(S): at 06:30

## 2018-04-12 RX ADMIN — Medication 10 MILLIGRAM(S): at 13:56

## 2018-04-12 RX ADMIN — Medication 10 MILLIGRAM(S): at 00:00

## 2018-04-12 RX ADMIN — HYDROMORPHONE HYDROCHLORIDE 0.5 MILLIGRAM(S): 2 INJECTION INTRAMUSCULAR; INTRAVENOUS; SUBCUTANEOUS at 09:30

## 2018-04-12 RX ADMIN — Medication 50 MILLIEQUIVALENT(S): at 22:00

## 2018-04-12 RX ADMIN — Medication 15 MILLIGRAM(S): at 14:41

## 2018-04-12 RX ADMIN — HYDROMORPHONE HYDROCHLORIDE 0.5 MILLIGRAM(S): 2 INJECTION INTRAMUSCULAR; INTRAVENOUS; SUBCUTANEOUS at 12:00

## 2018-04-12 RX ADMIN — Medication 15 MILLIGRAM(S): at 20:45

## 2018-04-12 RX ADMIN — HYDROMORPHONE HYDROCHLORIDE 0.5 MILLIGRAM(S): 2 INJECTION INTRAMUSCULAR; INTRAVENOUS; SUBCUTANEOUS at 04:00

## 2018-04-12 RX ADMIN — Medication 50 MILLIEQUIVALENT(S): at 21:30

## 2018-04-12 RX ADMIN — HYDROMORPHONE HYDROCHLORIDE 1 MILLIGRAM(S): 2 INJECTION INTRAMUSCULAR; INTRAVENOUS; SUBCUTANEOUS at 17:07

## 2018-04-12 RX ADMIN — Medication 50 MILLIEQUIVALENT(S): at 13:00

## 2018-04-12 RX ADMIN — Medication 1000 MILLIGRAM(S): at 13:15

## 2018-04-12 RX ADMIN — Medication 15 MILLIGRAM(S): at 20:30

## 2018-04-12 RX ADMIN — INSULIN HUMAN 3 UNIT(S)/HR: 100 INJECTION, SOLUTION SUBCUTANEOUS at 05:37

## 2018-04-12 NOTE — AIRWAY REMOVAL NOTE  ADULT & PEDS - ARTIFICAL AIRWAY REMOVAL COMMENTS
Written order for extubation verified. The patient was identified by full name and birth date compared to the identification band. Present during the procedure was Dave Hope RN.

## 2018-04-12 NOTE — PROGRESS NOTE ADULT - ASSESSMENT
37 year old male s/p type A dissection repair with biobentall  1. wean sedation  2. cpap trials, wean to extubate as tolerated  3. continue inotropes  4. gi ppx  5. vte ppx  6. pain management

## 2018-04-12 NOTE — PROGRESS NOTE ADULT - SUBJECTIVE AND OBJECTIVE BOX
Patient is seen He  is post op  Type  A dissection repair.He is extubated  He is complaining of left  hand finger numbness.CTA and US  reviewed   I believe he still has  dissection flap in the right CCA extending into the ICA Thrombus  seen  I recommend  anticoagulation with  Heparin  if possible  for now  This  can be  switched to  Antiplatelet  therapy  once this  is  stable  I spoke  to the CTU staff

## 2018-04-12 NOTE — PROGRESS NOTE ADULT - SUBJECTIVE AND OBJECTIVE BOX
Operation: type A dissection repair with biobentall   POD: 1  Narrative: intubated, sedated, follows commands    Vital Signs Last 24 Hrs  T(C): 38 (12 Apr 2018 00:00), Max: 38.5 (11 Apr 2018 21:00)  T(F): 100.4 (12 Apr 2018 00:00), Max: 101.3 (11 Apr 2018 21:00)  HR: 79 (12 Apr 2018 00:15) (70 - 87)  BP: 131/75 (11 Apr 2018 03:53) (131/75 - 149/83)  BP(mean): 98 (11 Apr 2018 03:53) (70 - 109)  RR: 13 (12 Apr 2018 00:15) (10 - 28)  SpO2: 96% (12 Apr 2018 00:15) (93% - 100%)  I&O's Detail    11 Apr 2018 07:01  -  12 Apr 2018 00:56  --------------------------------------------------------  IN:    aminocaproic acid Infusion: 250 mL    dexmedetomidine Infusion: 349.4 mL    DOBUTamine Infusion: 21.4 mL    DOBUTamine Infusion: 92.4 mL    insulin Infusion: 27 mL    IV PiggyBack: 400 mL    Lactated Ringers IV Bolus: 1000 mL    milrinone  Infusion: 139.1 mL    niCARdipine Infusion: 50 mL    propofol Infusion: 134 mL    sodium chloride 0.9%.: 120 mL    vasopressin Infusion: 18 mL  Total IN: 2601.3 mL    OUT:    Bulb: 335 mL    Chest Tube: 100 mL    Chest Tube: 35 mL    Indwelling Catheter - Urethral: 1895 mL    Nasoenteral Tube: 50 mL  Total OUT: 2415 mL    Total NET: 186.3 mL    LABS:    MEDICATIONS  (STANDING):  aminocaproic acid Infusion 5 Gm/Hr (250 mL/Hr) IV Continuous <Continuous>  cefuroxime  IVPB 1500 milliGRAM(s) IV Intermittent every 8 hours  dexmedetomidine Infusion 0.7 MICROgram(s)/kG/Hr (16.625 mL/Hr) IV Continuous <Continuous>  dextrose 5%. 1000 milliLiter(s) (50 mL/Hr) IV Continuous <Continuous>  dextrose 50% Injectable 12.5 Gram(s) IV Push once  dextrose 50% Injectable 25 Gram(s) IV Push once  dextrose 50% Injectable 25 Gram(s) IV Push once  DOBUTamine Infusion 2.5 MICROgram(s)/kG/Min (7.125 mL/Hr) IV Continuous <Continuous>  docusate sodium 100 milliGRAM(s) Oral three times a day  insulin Infusion 3 Unit(s)/Hr (3 mL/Hr) IV Continuous <Continuous>  ketorolac   Injectable 15 milliGRAM(s) IV Push every 8 hours  metoclopramide Injectable 10 milliGRAM(s) IV Push every 8 hours  milrinone Infusion 0.375 MICROgram(s)/kG/Min (10.688 mL/Hr) IV Continuous <Continuous>  niCARdipine Infusion 3 mG/Hr (15 mL/Hr) IV Continuous <Continuous>  pantoprazole  Injectable 40 milliGRAM(s) IV Push daily  propofol Infusion 20 MICROgram(s)/kG/Min (11.4 mL/Hr) IV Continuous <Continuous>  sodium chloride 0.9%. 1000 milliLiter(s) (10 mL/Hr) IV Continuous <Continuous>  vasopressin Infusion 0.1 Unit(s)/Min (6 mL/Hr) IV Continuous <Continuous>    MEDICATIONS  (PRN):  dextrose Gel 1 Dose(s) Oral once PRN Blood Glucose LESS THAN 70 milliGRAM(s)/deciLiter  glucagon  Injectable 1 milliGRAM(s) IntraMuscular once PRN Glucose <70 milliGRAM(s)/deciLiter  meperidine     Injectable 25 milliGRAM(s) IV Push once PRN For Shivering      General: intubated and sedated, follows commands with all four extremities, in no acute distress  Chest: sternal dressing C/D/I  Heart: S1, S2, regular rate and rhythm  Lungs: clear to auscultation B/L, without wheezes, rhonci, rales  Abdomen: Soft, slightly distended, non tender, positive bowel sounds  Extremeties: without edema B/L LE, positive DP pulses B/L     Does the patient have a history of CHF: no  -If yes, systolic or diastolic:  -pre-operative EF: normal     Extubation within 24 hours: less than 24 hours     Does the patient have a history of kidney disease: no  -give CKD stage if applicable:  -what is patient's baseline Creatinine: 1.13    Beta Blockers contraindicated for the first 24 hours due to vasopressor/inotrpic medication: yes  -If on pressors, indication: inotropic support    Did the patient receive transfusion of blood and/or products: yes  -If yes, indication: anemia-acute blood loss post op    DVT PPX: scd b/l     Celine-operative antibiotics discontinued within 48 hours of CTU admission: yes  -name/date/time of discontinue  -zinacef/4-12-18/21:00    Patient care discussed on morning interdisciplinary rounds with CTS team.

## 2018-04-13 LAB
ALBUMIN SERPL ELPH-MCNC: 3.2 G/DL — LOW (ref 3.3–5)
ALP SERPL-CCNC: 28 U/L — LOW (ref 40–120)
ALT FLD-CCNC: 43 U/L RC — SIGNIFICANT CHANGE UP (ref 10–45)
ANION GAP SERPL CALC-SCNC: 11 MMOL/L — SIGNIFICANT CHANGE UP (ref 5–17)
APTT BLD: 32.2 SEC — SIGNIFICANT CHANGE UP (ref 27.5–37.4)
AST SERPL-CCNC: 109 U/L — HIGH (ref 10–40)
BILIRUB SERPL-MCNC: 0.6 MG/DL — SIGNIFICANT CHANGE UP (ref 0.2–1.2)
BUN SERPL-MCNC: 19 MG/DL — SIGNIFICANT CHANGE UP (ref 7–23)
CALCIUM SERPL-MCNC: 7.6 MG/DL — LOW (ref 8.4–10.5)
CHLORIDE SERPL-SCNC: 110 MMOL/L — HIGH (ref 96–108)
CO2 SERPL-SCNC: 20 MMOL/L — LOW (ref 22–31)
CREAT SERPL-MCNC: 1.01 MG/DL — SIGNIFICANT CHANGE UP (ref 0.5–1.3)
GAS PNL BLDA: SIGNIFICANT CHANGE UP
GLUCOSE SERPL-MCNC: 147 MG/DL — HIGH (ref 70–99)
HCT VFR BLD CALC: 30.6 % — LOW (ref 39–50)
HGB BLD-MCNC: 10.9 G/DL — LOW (ref 13–17)
INR BLD: 1.34 RATIO — HIGH (ref 0.88–1.16)
MCHC RBC-ENTMCNC: 33.6 PG — SIGNIFICANT CHANGE UP (ref 27–34)
MCHC RBC-ENTMCNC: 35.5 GM/DL — SIGNIFICANT CHANGE UP (ref 32–36)
MCV RBC AUTO: 94.8 FL — SIGNIFICANT CHANGE UP (ref 80–100)
PLATELET # BLD AUTO: 127 K/UL — LOW (ref 150–400)
POTASSIUM SERPL-MCNC: 4.5 MMOL/L — SIGNIFICANT CHANGE UP (ref 3.5–5.3)
POTASSIUM SERPL-SCNC: 4.5 MMOL/L — SIGNIFICANT CHANGE UP (ref 3.5–5.3)
PROT SERPL-MCNC: 5.7 G/DL — LOW (ref 6–8.3)
PROTHROM AB SERPL-ACNC: 14.7 SEC — HIGH (ref 9.8–12.7)
RBC # BLD: 3.23 M/UL — LOW (ref 4.2–5.8)
RBC # FLD: 12.1 % — SIGNIFICANT CHANGE UP (ref 10.3–14.5)
SODIUM SERPL-SCNC: 141 MMOL/L — SIGNIFICANT CHANGE UP (ref 135–145)
WBC # BLD: 11.4 K/UL — HIGH (ref 3.8–10.5)
WBC # FLD AUTO: 11.4 K/UL — HIGH (ref 3.8–10.5)

## 2018-04-13 PROCEDURE — 71045 X-RAY EXAM CHEST 1 VIEW: CPT | Mod: 26

## 2018-04-13 PROCEDURE — 93010 ELECTROCARDIOGRAM REPORT: CPT

## 2018-04-13 RX ORDER — PANTOPRAZOLE SODIUM 20 MG/1
40 TABLET, DELAYED RELEASE ORAL
Qty: 0 | Refills: 0 | Status: DISCONTINUED | OUTPATIENT
Start: 2018-04-13 | End: 2018-04-22

## 2018-04-13 RX ORDER — LABETALOL HCL 100 MG
1 TABLET ORAL
Qty: 1000 | Refills: 0 | Status: DISCONTINUED | OUTPATIENT
Start: 2018-04-13 | End: 2018-04-13

## 2018-04-13 RX ORDER — CLOPIDOGREL BISULFATE 75 MG/1
75 TABLET, FILM COATED ORAL DAILY
Qty: 0 | Refills: 0 | Status: DISCONTINUED | OUTPATIENT
Start: 2018-04-13 | End: 2018-04-16

## 2018-04-13 RX ORDER — METOPROLOL TARTRATE 50 MG
25 TABLET ORAL EVERY 12 HOURS
Qty: 0 | Refills: 0 | Status: DISCONTINUED | OUTPATIENT
Start: 2018-04-13 | End: 2018-04-14

## 2018-04-13 RX ORDER — FUROSEMIDE 40 MG
20 TABLET ORAL ONCE
Qty: 0 | Refills: 0 | Status: COMPLETED | OUTPATIENT
Start: 2018-04-13 | End: 2018-04-13

## 2018-04-13 RX ORDER — LABETALOL HCL 100 MG
200 TABLET ORAL EVERY 8 HOURS
Qty: 0 | Refills: 0 | Status: DISCONTINUED | OUTPATIENT
Start: 2018-04-13 | End: 2018-04-13

## 2018-04-13 RX ORDER — METOCLOPRAMIDE HCL 10 MG
10 TABLET ORAL EVERY 8 HOURS
Qty: 0 | Refills: 0 | Status: COMPLETED | OUTPATIENT
Start: 2018-04-13 | End: 2018-04-15

## 2018-04-13 RX ORDER — KETOROLAC TROMETHAMINE 30 MG/ML
15 SYRINGE (ML) INJECTION EVERY 8 HOURS
Qty: 0 | Refills: 0 | Status: DISCONTINUED | OUTPATIENT
Start: 2018-04-13 | End: 2018-04-15

## 2018-04-13 RX ORDER — HYDRALAZINE HCL 50 MG
10 TABLET ORAL ONCE
Qty: 0 | Refills: 0 | Status: COMPLETED | OUTPATIENT
Start: 2018-04-13 | End: 2018-04-13

## 2018-04-13 RX ORDER — ENOXAPARIN SODIUM 100 MG/ML
60 INJECTION SUBCUTANEOUS EVERY 12 HOURS
Qty: 0 | Refills: 0 | Status: DISCONTINUED | OUTPATIENT
Start: 2018-04-14 | End: 2018-04-16

## 2018-04-13 RX ORDER — BUDESONIDE, MICRONIZED 100 %
0.5 POWDER (GRAM) MISCELLANEOUS EVERY 12 HOURS
Qty: 0 | Refills: 0 | Status: DISCONTINUED | OUTPATIENT
Start: 2018-04-13 | End: 2018-04-22

## 2018-04-13 RX ADMIN — SODIUM CHLORIDE 30 MILLILITER(S): 9 INJECTION INTRAMUSCULAR; INTRAVENOUS; SUBCUTANEOUS at 19:30

## 2018-04-13 RX ADMIN — Medication 0.1 MILLIGRAM(S): at 21:18

## 2018-04-13 RX ADMIN — Medication 10 MILLIGRAM(S): at 05:48

## 2018-04-13 RX ADMIN — Medication 0.5 MILLIGRAM(S): at 06:47

## 2018-04-13 RX ADMIN — Medication 20 MILLIGRAM(S): at 09:22

## 2018-04-13 RX ADMIN — Medication 15 MILLIGRAM(S): at 09:37

## 2018-04-13 RX ADMIN — HYDROMORPHONE HYDROCHLORIDE 30 MILLILITER(S): 2 INJECTION INTRAMUSCULAR; INTRAVENOUS; SUBCUTANEOUS at 07:07

## 2018-04-13 RX ADMIN — Medication 10 MILLIGRAM(S): at 09:22

## 2018-04-13 RX ADMIN — Medication 3 MILLILITER(S): at 17:24

## 2018-04-13 RX ADMIN — Medication 15 MILLIGRAM(S): at 17:04

## 2018-04-13 RX ADMIN — Medication 10 MILLIGRAM(S): at 21:18

## 2018-04-13 RX ADMIN — Medication 15 MILLIGRAM(S): at 02:04

## 2018-04-13 RX ADMIN — HYDROMORPHONE HYDROCHLORIDE 30 MILLILITER(S): 2 INJECTION INTRAMUSCULAR; INTRAVENOUS; SUBCUTANEOUS at 19:19

## 2018-04-13 RX ADMIN — Medication 100 MILLIGRAM(S): at 14:05

## 2018-04-13 RX ADMIN — Medication 325 MILLIGRAM(S): at 11:35

## 2018-04-13 RX ADMIN — Medication 15 MILLIGRAM(S): at 17:34

## 2018-04-13 RX ADMIN — Medication 3 MILLILITER(S): at 23:20

## 2018-04-13 RX ADMIN — Medication 25 MILLIGRAM(S): at 21:18

## 2018-04-13 RX ADMIN — Medication 20 MILLIGRAM(S): at 14:41

## 2018-04-13 RX ADMIN — Medication 15 MILLIGRAM(S): at 02:15

## 2018-04-13 RX ADMIN — CLOPIDOGREL BISULFATE 75 MILLIGRAM(S): 75 TABLET, FILM COATED ORAL at 21:40

## 2018-04-13 RX ADMIN — Medication 100 MILLIGRAM(S): at 21:18

## 2018-04-13 RX ADMIN — ENOXAPARIN SODIUM 40 MILLIGRAM(S): 100 INJECTION SUBCUTANEOUS at 05:49

## 2018-04-13 RX ADMIN — Medication 0.1 MILLIGRAM(S): at 04:13

## 2018-04-13 RX ADMIN — Medication 3 MILLILITER(S): at 05:08

## 2018-04-13 RX ADMIN — Medication 3 MILLILITER(S): at 11:42

## 2018-04-13 RX ADMIN — Medication 15 MILLIGRAM(S): at 09:22

## 2018-04-13 RX ADMIN — Medication 0.5 MILLIGRAM(S): at 17:24

## 2018-04-13 RX ADMIN — Medication 0.1 MILLIGRAM(S): at 14:04

## 2018-04-13 RX ADMIN — ENOXAPARIN SODIUM 40 MILLIGRAM(S): 100 INJECTION SUBCUTANEOUS at 17:04

## 2018-04-13 NOTE — PHYSICAL THERAPY INITIAL EVALUATION ADULT - PRECAUTIONS/LIMITATIONS, REHAB EVAL
On CTA, found to have: Ascending aortic aneurysm measuring 6 cm. 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 There is extension into the brachiocephalic artery with nonopacification of the right common carotid artery concerning for acute occlusion. There is also extension into the proximal portion of the left subclavian artery. Dissection extends down into the descending aorta and ends at the level of the renal arteries. There is extension of the dissection into the celiac and superior mesenteric artery. 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 The inferior mesenteric artery is patent./cardiac precautions

## 2018-04-13 NOTE — PHYSICAL THERAPY INITIAL EVALUATION ADULT - GAIT DEVIATIONS NOTED, PT EVAL
decreased velocity of limb motion/decreased step length/decreased weight-shifting ability/decreased miguel

## 2018-04-13 NOTE — PROGRESS NOTE ADULT - SUBJECTIVE AND OBJECTIVE BOX
Day _1_ of Anesthesia Pain Management Service    SUBJECTIVE: Patient is doing well with IV PCA    Pain Scale Score:	[X] Refer to charted pain scores    THERAPY:    [ ] IV PCA Morphine		[ ] 5 mg/mL	[ ] 1 mg/mL  [X] IV PCA Hydromorphone	[ ] 5 mg/mL	[X] 1 mg/mL  [ ] IV PCA Fentanyl		[ ] 50 micrograms/mL    Demand dose: 0.2 mg     Lockout: 6 minutes   Continuous Rate: 0 mg/hr  4 Hour Limit: 4 mg    MEDICATIONS  (STANDING):  ALBUTerol/ipratropium for Nebulization 3 milliLiter(s) Nebulizer every 6 hours  aspirin enteric coated 325 milliGRAM(s) Oral daily  buDESOnide   0.5 milliGRAM(s) Respule 0.5 milliGRAM(s) Inhalation every 12 hours  cloNIDine 0.1 milliGRAM(s) Oral every 8 hours  dextrose 5%. 1000 milliLiter(s) (50 mL/Hr) IV Continuous <Continuous>  dextrose 50% Injectable 12.5 Gram(s) IV Push once  dextrose 50% Injectable 25 Gram(s) IV Push once  dextrose 50% Injectable 25 Gram(s) IV Push once  docusate sodium 100 milliGRAM(s) Oral three times a day  enoxaparin Injectable 40 milliGRAM(s) SubCutaneous every 12 hours  HYDROmorphone  Injectable 1 milliGRAM(s) IV Push once  HYDROmorphone PCA (1 mG/mL) 30 milliLiter(s) PCA Continuous PCA Continuous  insulin Infusion 1 Unit(s)/Hr (1 mL/Hr) IV Continuous <Continuous>  insulin lispro (HumaLOG) corrective regimen sliding scale   SubCutaneous three times a day before meals  insulin lispro (HumaLOG) corrective regimen sliding scale   SubCutaneous at bedtime  ketorolac   Injectable 15 milliGRAM(s) IV Push every 8 hours  labetalol Infusion 1 mG/Min (15 mL/Hr) IV Continuous <Continuous>  niCARdipine Infusion 10 mG/Hr (50 mL/Hr) IV Continuous <Continuous>  pantoprazole    Tablet 40 milliGRAM(s) Oral before breakfast    MEDICATIONS  (PRN):  dextrose Gel 1 Dose(s) Oral once PRN Blood Glucose LESS THAN 70 milliGRAM(s)/deciLiter  glucagon  Injectable 1 milliGRAM(s) IntraMuscular once PRN Glucose <70 milliGRAM(s)/deciLiter  HYDROmorphone PCA (1 mG/mL) Rescue Clinician Bolus 0.5 milliGRAM(s) IV Push every 15 minutes PRN for Pain Scale GREATER THAN 6  naloxone Injectable 0.1 milliGRAM(s) IV Push every 3 minutes PRN For ANY of the following changes in patient status:  A. RR LESS THAN 10 breaths per minute, B. Oxygen saturation LESS THAN 90%, C. Sedation score of 6  ondansetron Injectable 4 milliGRAM(s) IV Push every 6 hours PRN Nausea      OBJECTIVE:    Sedation Score:	[ X] Alert	[ ] Drowsy 	[ ] Arousable	[ ] Asleep	[ ] Unresponsive    Side Effects:	[X ] None	[ ] Nausea	[ ] Vomiting	[ ] Pruritus  		[ ] Other:    Vital Signs Last 24 Hrs  T(C): 37.2 (13 Apr 2018 04:00), Max: 37.2 (12 Apr 2018 20:00)  T(F): 99 (13 Apr 2018 04:00), Max: 99 (12 Apr 2018 20:00)  HR: 89 (13 Apr 2018 09:15) (76 - 91)  BP: --  BP(mean): --  RR: 26 (13 Apr 2018 09:15) (15 - 39)  SpO2: 97% (13 Apr 2018 09:15) (88% - 100%)    ASSESSMENT/ PLAN    Therapy to  be:               [X] Continued   [ ] Discontinued   [ ] Changed to PRN Analgesics    Documentation and Verification of current medications:   [X] Done	[ ] Not done, not eligible    Comments:

## 2018-04-13 NOTE — PHYSICAL THERAPY INITIAL EVALUATION ADULT - PLANNED THERAPY INTERVENTIONS, PT EVAL
gait training/bed mobility training/stair negotiation: GOAL: Pt will be able to negotiate 10 steps +HR with reciprocal pattern in 2 weeks./transfer training

## 2018-04-13 NOTE — PHYSICAL THERAPY INITIAL EVALUATION ADULT - PERTINENT HX OF CURRENT PROBLEM, REHAB EVAL
Pt is a 36 y/o male admitted to [facility] on 4/11/18 transferred from [facility]. PMH of HTN not on any meds anymore, current every day smoker presents with chest pain for 2 hours. Pt states 2 hours ago he was working out then took a hot shower and started to have severe chest pain, radiating down the abdomen with nausea and diaphoresis. Pt states worse with exertion.

## 2018-04-14 LAB
ALBUMIN SERPL ELPH-MCNC: 2.9 G/DL — LOW (ref 3.3–5)
ALP SERPL-CCNC: 27 U/L — LOW (ref 40–120)
ALT FLD-CCNC: 35 U/L RC — SIGNIFICANT CHANGE UP (ref 10–45)
ANION GAP SERPL CALC-SCNC: 10 MMOL/L — SIGNIFICANT CHANGE UP (ref 5–17)
AST SERPL-CCNC: 59 U/L — HIGH (ref 10–40)
BILIRUB SERPL-MCNC: 0.3 MG/DL — SIGNIFICANT CHANGE UP (ref 0.2–1.2)
BUN SERPL-MCNC: 25 MG/DL — HIGH (ref 7–23)
CALCIUM SERPL-MCNC: 7.7 MG/DL — LOW (ref 8.4–10.5)
CHLORIDE SERPL-SCNC: 107 MMOL/L — SIGNIFICANT CHANGE UP (ref 96–108)
CO2 SERPL-SCNC: 22 MMOL/L — SIGNIFICANT CHANGE UP (ref 22–31)
CREAT SERPL-MCNC: 1.06 MG/DL — SIGNIFICANT CHANGE UP (ref 0.5–1.3)
GAS PNL BLDA: SIGNIFICANT CHANGE UP
GLUCOSE SERPL-MCNC: 105 MG/DL — HIGH (ref 70–99)
HCT VFR BLD CALC: 25.2 % — LOW (ref 39–50)
HCT VFR BLD CALC: 27.6 % — LOW (ref 39–50)
HGB BLD-MCNC: 8.8 G/DL — LOW (ref 13–17)
HGB BLD-MCNC: 9.9 G/DL — LOW (ref 13–17)
MCHC RBC-ENTMCNC: 33.4 PG — SIGNIFICANT CHANGE UP (ref 27–34)
MCHC RBC-ENTMCNC: 34.5 PG — HIGH (ref 27–34)
MCHC RBC-ENTMCNC: 34.8 GM/DL — SIGNIFICANT CHANGE UP (ref 32–36)
MCHC RBC-ENTMCNC: 36 GM/DL — SIGNIFICANT CHANGE UP (ref 32–36)
MCV RBC AUTO: 95.8 FL — SIGNIFICANT CHANGE UP (ref 80–100)
MCV RBC AUTO: 95.9 FL — SIGNIFICANT CHANGE UP (ref 80–100)
PF4 HEPARIN CMPLX AB SER-ACNC: NEGATIVE — SIGNIFICANT CHANGE UP
PF4 HEPARIN CMPLX AB SERPL QL IA: 0.08 ABS — SIGNIFICANT CHANGE UP
PHOSPHATE SERPL-MCNC: 3.2 MG/DL — SIGNIFICANT CHANGE UP (ref 2.5–4.5)
PLATELET # BLD AUTO: 118 K/UL — LOW (ref 150–400)
PLATELET # BLD AUTO: 86 K/UL — LOW (ref 150–400)
POTASSIUM SERPL-MCNC: 4.7 MMOL/L — SIGNIFICANT CHANGE UP (ref 3.5–5.3)
POTASSIUM SERPL-SCNC: 4.7 MMOL/L — SIGNIFICANT CHANGE UP (ref 3.5–5.3)
PROT SERPL-MCNC: 5.2 G/DL — LOW (ref 6–8.3)
RBC # BLD: 2.62 M/UL — LOW (ref 4.2–5.8)
RBC # BLD: 2.88 M/UL — LOW (ref 4.2–5.8)
RBC # FLD: 12.2 % — SIGNIFICANT CHANGE UP (ref 10.3–14.5)
RBC # FLD: 12.3 % — SIGNIFICANT CHANGE UP (ref 10.3–14.5)
SODIUM SERPL-SCNC: 139 MMOL/L — SIGNIFICANT CHANGE UP (ref 135–145)
WBC # BLD: 7.5 K/UL — SIGNIFICANT CHANGE UP (ref 3.8–10.5)
WBC # BLD: 8.5 K/UL — SIGNIFICANT CHANGE UP (ref 3.8–10.5)
WBC # FLD AUTO: 7.5 K/UL — SIGNIFICANT CHANGE UP (ref 3.8–10.5)
WBC # FLD AUTO: 8.5 K/UL — SIGNIFICANT CHANGE UP (ref 3.8–10.5)

## 2018-04-14 PROCEDURE — 99291 CRITICAL CARE FIRST HOUR: CPT

## 2018-04-14 PROCEDURE — 71045 X-RAY EXAM CHEST 1 VIEW: CPT | Mod: 26

## 2018-04-14 RX ORDER — METOPROLOL TARTRATE 50 MG
25 TABLET ORAL ONCE
Qty: 0 | Refills: 0 | Status: COMPLETED | OUTPATIENT
Start: 2018-04-14 | End: 2018-04-14

## 2018-04-14 RX ORDER — FUROSEMIDE 40 MG
20 TABLET ORAL ONCE
Qty: 0 | Refills: 0 | Status: COMPLETED | OUTPATIENT
Start: 2018-04-14 | End: 2018-04-14

## 2018-04-14 RX ORDER — METOPROLOL TARTRATE 50 MG
25 TABLET ORAL EVERY 8 HOURS
Qty: 0 | Refills: 0 | Status: DISCONTINUED | OUTPATIENT
Start: 2018-04-14 | End: 2018-04-15

## 2018-04-14 RX ORDER — POTASSIUM CHLORIDE 20 MEQ
40 PACKET (EA) ORAL ONCE
Qty: 0 | Refills: 0 | Status: COMPLETED | OUTPATIENT
Start: 2018-04-14 | End: 2018-04-14

## 2018-04-14 RX ORDER — SODIUM CHLORIDE 9 MG/ML
1000 INJECTION INTRAMUSCULAR; INTRAVENOUS; SUBCUTANEOUS
Qty: 0 | Refills: 0 | Status: DISCONTINUED | OUTPATIENT
Start: 2018-04-14 | End: 2018-04-16

## 2018-04-14 RX ADMIN — Medication 3 MILLILITER(S): at 23:38

## 2018-04-14 RX ADMIN — Medication 10 MILLIGRAM(S): at 05:11

## 2018-04-14 RX ADMIN — HYDROMORPHONE HYDROCHLORIDE 30 MILLILITER(S): 2 INJECTION INTRAMUSCULAR; INTRAVENOUS; SUBCUTANEOUS at 07:09

## 2018-04-14 RX ADMIN — Medication 25 MILLIGRAM(S): at 17:05

## 2018-04-14 RX ADMIN — Medication 3 MILLILITER(S): at 11:48

## 2018-04-14 RX ADMIN — Medication 10 MILLIGRAM(S): at 20:49

## 2018-04-14 RX ADMIN — Medication 0.5 MILLIGRAM(S): at 05:31

## 2018-04-14 RX ADMIN — Medication 25 MILLIGRAM(S): at 13:56

## 2018-04-14 RX ADMIN — Medication 15 MILLIGRAM(S): at 16:00

## 2018-04-14 RX ADMIN — Medication 15 MILLIGRAM(S): at 16:15

## 2018-04-14 RX ADMIN — HYDROMORPHONE HYDROCHLORIDE 30 MILLILITER(S): 2 INJECTION INTRAMUSCULAR; INTRAVENOUS; SUBCUTANEOUS at 19:00

## 2018-04-14 RX ADMIN — CLOPIDOGREL BISULFATE 75 MILLIGRAM(S): 75 TABLET, FILM COATED ORAL at 11:44

## 2018-04-14 RX ADMIN — PANTOPRAZOLE SODIUM 40 MILLIGRAM(S): 20 TABLET, DELAYED RELEASE ORAL at 08:19

## 2018-04-14 RX ADMIN — ENOXAPARIN SODIUM 60 MILLIGRAM(S): 100 INJECTION SUBCUTANEOUS at 06:00

## 2018-04-14 RX ADMIN — Medication 325 MILLIGRAM(S): at 11:45

## 2018-04-14 RX ADMIN — Medication 3 MILLILITER(S): at 05:31

## 2018-04-14 RX ADMIN — Medication 3 MILLILITER(S): at 17:14

## 2018-04-14 RX ADMIN — HYDROMORPHONE HYDROCHLORIDE 30 MILLILITER(S): 2 INJECTION INTRAMUSCULAR; INTRAVENOUS; SUBCUTANEOUS at 09:58

## 2018-04-14 RX ADMIN — Medication 100 MILLIGRAM(S): at 13:14

## 2018-04-14 RX ADMIN — Medication 20 MILLIGRAM(S): at 03:59

## 2018-04-14 RX ADMIN — Medication 15 MILLIGRAM(S): at 08:00

## 2018-04-14 RX ADMIN — Medication 0.5 MILLIGRAM(S): at 17:14

## 2018-04-14 RX ADMIN — Medication 15 MILLIGRAM(S): at 00:15

## 2018-04-14 RX ADMIN — ENOXAPARIN SODIUM 60 MILLIGRAM(S): 100 INJECTION SUBCUTANEOUS at 17:05

## 2018-04-14 RX ADMIN — Medication 10 MILLIGRAM(S): at 13:14

## 2018-04-14 RX ADMIN — Medication 15 MILLIGRAM(S): at 00:00

## 2018-04-14 RX ADMIN — Medication 25 MILLIGRAM(S): at 05:12

## 2018-04-14 RX ADMIN — Medication 100 MILLIGRAM(S): at 20:48

## 2018-04-14 RX ADMIN — Medication 15 MILLIGRAM(S): at 08:15

## 2018-04-14 RX ADMIN — Medication 25 MILLIGRAM(S): at 21:15

## 2018-04-14 RX ADMIN — Medication 100 MILLIGRAM(S): at 05:12

## 2018-04-14 RX ADMIN — Medication 40 MILLIEQUIVALENT(S): at 11:45

## 2018-04-14 NOTE — PROGRESS NOTE ADULT - SUBJECTIVE AND OBJECTIVE BOX
Day _2__ of Anesthesia Pain Management Service    SUBJECTIVE:    Pain Scale Score	At rest: ___ 	With Activity: ___ 	[x ] Refer to charted pain scores    THERAPY:    [ ] IV PCA Morphine		[ ] 5 mg/mL	[ ] 1 mg/mL  [x ] IV PCA Hydromorphone	[ ] 5 mg/mL	[x ] 1 mg/mL  [ ] IV PCA Fentanyl		[ ] 50 micrograms/mL    Demand dose 0.2    lockout 6   (minutes) Continuous Rate 0      MEDICATIONS  (STANDING):  ALBUTerol/ipratropium for Nebulization 3 milliLiter(s) Nebulizer every 6 hours  aspirin enteric coated 325 milliGRAM(s) Oral daily  buDESOnide   0.5 milliGRAM(s) Respule 0.5 milliGRAM(s) Inhalation every 12 hours  clopidogrel Tablet 75 milliGRAM(s) Oral daily  dextrose 5%. 1000 milliLiter(s) (50 mL/Hr) IV Continuous <Continuous>  dextrose 50% Injectable 12.5 Gram(s) IV Push once  dextrose 50% Injectable 25 Gram(s) IV Push once  dextrose 50% Injectable 25 Gram(s) IV Push once  docusate sodium 100 milliGRAM(s) Oral three times a day  enoxaparin Injectable 60 milliGRAM(s) SubCutaneous every 12 hours  HYDROmorphone  Injectable 1 milliGRAM(s) IV Push once  HYDROmorphone PCA (1 mG/mL) 30 milliLiter(s) PCA Continuous PCA Continuous  insulin lispro (HumaLOG) corrective regimen sliding scale   SubCutaneous three times a day before meals  insulin lispro (HumaLOG) corrective regimen sliding scale   SubCutaneous at bedtime  ketorolac   Injectable 15 milliGRAM(s) IV Push every 8 hours  metoclopramide Injectable 10 milliGRAM(s) IV Push every 8 hours  metoprolol tartrate 25 milliGRAM(s) Oral every 12 hours  pantoprazole    Tablet 40 milliGRAM(s) Oral before breakfast  sodium chloride 0.9%. 1000 milliLiter(s) (30 mL/Hr) IV Continuous <Continuous>    MEDICATIONS  (PRN):  dextrose Gel 1 Dose(s) Oral once PRN Blood Glucose LESS THAN 70 milliGRAM(s)/deciLiter  glucagon  Injectable 1 milliGRAM(s) IntraMuscular once PRN Glucose <70 milliGRAM(s)/deciLiter  HYDROmorphone PCA (1 mG/mL) Rescue Clinician Bolus 0.5 milliGRAM(s) IV Push every 15 minutes PRN for Pain Scale GREATER THAN 6  naloxone Injectable 0.1 milliGRAM(s) IV Push every 3 minutes PRN For ANY of the following changes in patient status:  A. RR LESS THAN 10 breaths per minute, B. Oxygen saturation LESS THAN 90%, C. Sedation score of 6  ondansetron Injectable 4 milliGRAM(s) IV Push every 6 hours PRN Nausea      OBJECTIVE:    Sedation Score:	[x ] Alert	[ ] Drowsy 	[ ] Arousable	[ ] Asleep	[ ] Unresponsive    Side Effects:	[x ] None	[ ] Nausea	[ ] Vomiting	[ ] Pruritus  		[ ] Other:    Vital Signs Last 24 Hrs  T(C): 36.9 (14 Apr 2018 03:00), Max: 37 (13 Apr 2018 23:00)  T(F): 98.5 (14 Apr 2018 03:00), Max: 98.6 (13 Apr 2018 23:00)  HR: 71 (14 Apr 2018 10:00) (67 - 88)  BP: --  BP(mean): --  RR: 14 (14 Apr 2018 10:00) (11 - 38)  SpO2: 90% (14 Apr 2018 10:00) (89% - 100%)    ASSESSMENT/ PLAN    Therapy to  be:	[x ] Continue   [ ] Discontinued   [ ] Change to prn Analgesics    Documentation and Verification of current medications:   [X] Done	[ ] Not done, not eligible    Comments:

## 2018-04-14 NOTE — PROGRESS NOTE ADULT - SUBJECTIVE AND OBJECTIVE BOX
WILLIE CLEARY  MRN-[id number]    Admission HPI:  As per [facility] EMR: 36yo male with pmh of HTN not on any meds anymore, current every day smoker presents with chest pain for 2 hours. Pt states 2 hours ago he was working out then took a hot shower and started to have severe chest pain, radiating down the abdomen with nausea and diaphoresis. Pt states worse with exertion. Pt denies v/d, urinary symptoms, loc, recent travel and sickness. Denies cocaine use endorses marijuana use. Pt denies family ho of cardiac disease.  On CTA, found to have: Ascending aortic aneurysm measuring 6 cm. 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 There is   extension into the brachiocephalic artery with nonopacification of the right common carotid artery concerning for acute occlusion. There is also extension into the proximal portion of the left subclavian artery. Dissection extends down into the descending aorta and ends at the level of the renal arteries. There is extension of the dissection into the celiac and superior mesenteric artery. 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 The inferior mesenteric artery is patent.  Transferred to [facility] for emergent surgical repair. (11 Apr 2018 03:59)      surgery/Hospital course  4/11 OR: Biobental for repair of acute type A aortic ic dissection   post op hypoxemia, required bipap and high flow oxygen, on 100% titrated down.   mild incisional pain on PCA;  with severe hypertension,      PAST MEDICAL & SURGICAL HISTORY:  HTN (hypertension)  No significant past surgical history    Allergies No Known Allergies    PHYSICAL EXAM:  T(C): 37.4, Max: 38 (04-14-18 @ 12:00)  HR: 80 (67 - 80)  BP: 148/70 (126/78 - 152/70)  RR: 20 (11 - 27)  SpO2: 94% (90% - 100%)  Gen:  Awake, alert,on high flow oxygen  CNS:A&O x3, No focal deficit                                                                                                                                       Eyes:PERRL                                            ENT/Neck: no JVD	  RES :+Breath sounds , No rhonchi, No wheezing  CVS: regular rhythm. Normal S1/S2. No Murmur;   Abd: soft; not distented; No Tenderness. Bowel sounds present .   Extremities:No edema  Skin: No rash  Psych: No lethargy .    MEDICATIONS  (STANDING):  ALBUTerol/ipratropium for Nebulization 3 milliLiter(s) Nebulizer every 6 hours  aspirin enteric coated 325 milliGRAM(s) Oral daily  buDESOnide   0.5 milliGRAM(s) Respule 0.5 milliGRAM(s) Inhalation every 12 hours  clopidogrel Tablet 75 milliGRAM(s) Oral daily  dextrose 5%. 1000 milliLiter(s) (50 mL/Hr) IV Continuous <Continuous>  dextrose 50% Injectable 12.5 Gram(s) IV Push once  dextrose 50% Injectable 25 Gram(s) IV Push once  dextrose 50% Injectable 25 Gram(s) IV Push once  docusate sodium 100 milliGRAM(s) Oral three times a day  enoxaparin Injectable 60 milliGRAM(s) SubCutaneous every 12 hours  HYDROmorphone  Injectable 1 milliGRAM(s) IV Push once  HYDROmorphone PCA (1 mG/mL) 30 milliLiter(s) PCA Continuous PCA Continuous  insulin lispro (HumaLOG) corrective regimen sliding scale   SubCutaneous three times a day before meals  insulin lispro (HumaLOG) corrective regimen sliding scale   SubCutaneous at bedtime  ketorolac   Injectable 15 milliGRAM(s) IV Push every 8 hours  metoclopramide Injectable 10 milliGRAM(s) IV Push every 8 hours  metoprolol tartrate 25 milliGRAM(s) Oral every 8 hours  pantoprazole    Tablet 40 milliGRAM(s) Oral before breakfast  sodium chloride 0.9%. 1000 milliLiter(s) (30 mL/Hr) IV Continuous <Continuous>    MEDICATIONS  (PRN):  dextrose Gel 1 Dose(s) Oral once PRN Blood Glucose LESS THAN 70 milliGRAM(s)/deciLiter  glucagon  Injectable 1 milliGRAM(s) IntraMuscular once PRN Glucose <70 milliGRAM(s)/deciLiter  HYDROmorphone PCA (1 mG/mL) Rescue Clinician Bolus 0.5 milliGRAM(s) IV Push every 15 minutes PRN for Pain Scale GREATER THAN 6  naloxone Injectable 0.1 milliGRAM(s) IV Push every 3 minutes PRN For ANY of the following changes in patient status:  A. RR LESS THAN 10 breaths per minute, B. Oxygen saturation LESS THAN 90%, C. Sedation score of 6  ondansetron Injectable 4 milliGRAM(s) IV Push every 6 hours PRN Nausea        Labs:                        9.9    8.5   )-----------( 118                   27.6         139  |  107  |  25  ----------------------------<  105  4.7   |  22  |  1.06  Ca    7.7     Phos  3.2       TPro  5.2  /  Alb  2.9  /  TBili  0.3  /  DBili  x   /  AST  59  /  ALT  35  /  AlkPhos  27    LIVER FUNCTIONS -   Alb: 2.9 g/dL / Pro: 5.2 g/dL / ALK PHOS: 27 U/L / ALT: 35 U/L RC / AST: 59 U/L / GGT: x         PT/INR -  PT: 14.7 sec;   INR: 1.34 ratio       PTT - PTT:32.2 sec  ABG - pH: 7.40  /  pCO2: 40    /  pO2: 84    / HCO3: 24    / Base Excess: -.5   /  SaO2: 97        ASSESMENT :  4/11/2018  BioBental for  acute type A dissection   hypertensive emergency  acute hypoxemic respiratory failure  thrombocytopenia  cigarette smoking, nicotine dependencr=e================================================================================================================================  PLAN:   =Neuro: analgesics   HYDROmorphone  Injectable 1 IV Push once  HYDROmorphone PCA (1 mG/mL) 30 PCA Continuous PCA Continuous  HYDROmorphone PCA (1 mG/mL) Rescue Clinician Bolus 0.5 IV Push every 15 minutes PRN  ketorolac   Injectable 15 IV Push every 8 hours  metoclopramide Injectable 10 IV Push every 8 hours  ondansetron Injectable 4 IV Push every 6 hours PRN    = Respiratory: Brocnhodilators, inhaled steroid, respiratory support   ALBUTerol/ipratropium for Nebulization 3 milliLiter(s) every 6 hours  buDESOnide   0.5 milliGRAM(s) Respule 0.5 milliGRAM(s) every 12 hours    =CVS/Hem:  metoprolol tartrate 25 Oral every 8 hours  aspirin enteric coated 325 Oral daily  clopidogrel Tablet 75 Oral daily  enoxaparin Injectable 60 SubCutaneous every 12 hours    =Renal: Swartz:  continue to  Monitor I/Os   =GI: GI prophylaxis;Feeding : @  ml/hr  dextrose 5%. 1000 IV Continuous <Continuous>  docusate sodium 100 Oral three times a day  pantoprazole    Tablet 40 Oral before breakfast  sodium chloride 0.9%. 1000 IV Continuous <Continuous>    =Endo: Glycemic control;   insulin lispro (HumaLOG) corrective regimen sliding scale  SubCutaneous three times a day before meals  insulin lispro (HumaLOG) corrective regimen sliding scale  SubCutaneous at bedtime    Patient requires continuous monitoring with bedside rhythm monitoring, arterial line, pulse oximetry, ;intermittent blood gas analysis.  Care plan discussed with ICU care team.  patient remain critical; required more than usual post op care; I have spent 35 minutes providing non routine post op care.

## 2018-04-15 LAB
ALBUMIN SERPL ELPH-MCNC: 3.1 G/DL — LOW (ref 3.3–5)
ALP SERPL-CCNC: 39 U/L — LOW (ref 40–120)
ALT FLD-CCNC: 37 U/L RC — SIGNIFICANT CHANGE UP (ref 10–45)
ANION GAP SERPL CALC-SCNC: 9 MMOL/L — SIGNIFICANT CHANGE UP (ref 5–17)
AST SERPL-CCNC: 44 U/L — HIGH (ref 10–40)
BILIRUB SERPL-MCNC: 0.3 MG/DL — SIGNIFICANT CHANGE UP (ref 0.2–1.2)
BUN SERPL-MCNC: 24 MG/DL — HIGH (ref 7–23)
CALCIUM SERPL-MCNC: 8.1 MG/DL — LOW (ref 8.4–10.5)
CHLORIDE SERPL-SCNC: 104 MMOL/L — SIGNIFICANT CHANGE UP (ref 96–108)
CO2 SERPL-SCNC: 24 MMOL/L — SIGNIFICANT CHANGE UP (ref 22–31)
CREAT SERPL-MCNC: 0.95 MG/DL — SIGNIFICANT CHANGE UP (ref 0.5–1.3)
GAS PNL BLDA: SIGNIFICANT CHANGE UP
GAS PNL BLDA: SIGNIFICANT CHANGE UP
GLUCOSE SERPL-MCNC: 116 MG/DL — HIGH (ref 70–99)
HCT VFR BLD CALC: 27.9 % — LOW (ref 39–50)
HGB BLD-MCNC: 9.5 G/DL — LOW (ref 13–17)
MCHC RBC-ENTMCNC: 32.8 PG — SIGNIFICANT CHANGE UP (ref 27–34)
MCHC RBC-ENTMCNC: 34.1 GM/DL — SIGNIFICANT CHANGE UP (ref 32–36)
MCV RBC AUTO: 96.3 FL — SIGNIFICANT CHANGE UP (ref 80–100)
PHOSPHATE SERPL-MCNC: 3.4 MG/DL — SIGNIFICANT CHANGE UP (ref 2.5–4.5)
PLATELET # BLD AUTO: 107 K/UL — LOW (ref 150–400)
POTASSIUM SERPL-MCNC: 4.5 MMOL/L — SIGNIFICANT CHANGE UP (ref 3.5–5.3)
POTASSIUM SERPL-SCNC: 4.5 MMOL/L — SIGNIFICANT CHANGE UP (ref 3.5–5.3)
PROT SERPL-MCNC: 5.4 G/DL — LOW (ref 6–8.3)
RBC # BLD: 2.89 M/UL — LOW (ref 4.2–5.8)
RBC # FLD: 12.1 % — SIGNIFICANT CHANGE UP (ref 10.3–14.5)
SODIUM SERPL-SCNC: 137 MMOL/L — SIGNIFICANT CHANGE UP (ref 135–145)
WBC # BLD: 7 K/UL — SIGNIFICANT CHANGE UP (ref 3.8–10.5)
WBC # FLD AUTO: 7 K/UL — SIGNIFICANT CHANGE UP (ref 3.8–10.5)

## 2018-04-15 PROCEDURE — 99291 CRITICAL CARE FIRST HOUR: CPT

## 2018-04-15 PROCEDURE — 99292 CRITICAL CARE ADDL 30 MIN: CPT

## 2018-04-15 PROCEDURE — 71045 X-RAY EXAM CHEST 1 VIEW: CPT | Mod: 26

## 2018-04-15 RX ORDER — METOPROLOL TARTRATE 50 MG
5 TABLET ORAL ONCE
Qty: 0 | Refills: 0 | Status: COMPLETED | OUTPATIENT
Start: 2018-04-15 | End: 2018-04-15

## 2018-04-15 RX ORDER — HYDRALAZINE HCL 50 MG
5 TABLET ORAL ONCE
Qty: 0 | Refills: 0 | Status: COMPLETED | OUTPATIENT
Start: 2018-04-15 | End: 2018-04-15

## 2018-04-15 RX ORDER — LABETALOL HCL 100 MG
10 TABLET ORAL ONCE
Qty: 0 | Refills: 0 | Status: COMPLETED | OUTPATIENT
Start: 2018-04-15 | End: 2018-04-15

## 2018-04-15 RX ORDER — FUROSEMIDE 40 MG
20 TABLET ORAL ONCE
Qty: 0 | Refills: 0 | Status: COMPLETED | OUTPATIENT
Start: 2018-04-15 | End: 2018-04-15

## 2018-04-15 RX ORDER — LABETALOL HCL 100 MG
300 TABLET ORAL EVERY 8 HOURS
Qty: 0 | Refills: 0 | Status: DISCONTINUED | OUTPATIENT
Start: 2018-04-15 | End: 2018-04-16

## 2018-04-15 RX ORDER — LABETALOL HCL 100 MG
15 TABLET ORAL ONCE
Qty: 0 | Refills: 0 | Status: DISCONTINUED | OUTPATIENT
Start: 2018-04-15 | End: 2018-04-15

## 2018-04-15 RX ORDER — LABETALOL HCL 100 MG
20 TABLET ORAL ONCE
Qty: 0 | Refills: 0 | Status: COMPLETED | OUTPATIENT
Start: 2018-04-15 | End: 2018-04-15

## 2018-04-15 RX ORDER — LABETALOL HCL 100 MG
600 TABLET ORAL EVERY 8 HOURS
Qty: 0 | Refills: 0 | Status: DISCONTINUED | OUTPATIENT
Start: 2018-04-15 | End: 2018-04-15

## 2018-04-15 RX ORDER — NICARDIPINE HYDROCHLORIDE 30 MG/1
3 CAPSULE, EXTENDED RELEASE ORAL
Qty: 40 | Refills: 0 | Status: DISCONTINUED | OUTPATIENT
Start: 2018-04-15 | End: 2018-04-15

## 2018-04-15 RX ORDER — METOPROLOL TARTRATE 50 MG
50 TABLET ORAL EVERY 12 HOURS
Qty: 0 | Refills: 0 | Status: DISCONTINUED | OUTPATIENT
Start: 2018-04-15 | End: 2018-04-15

## 2018-04-15 RX ORDER — LABETALOL HCL 100 MG
300 TABLET ORAL EVERY 8 HOURS
Qty: 0 | Refills: 0 | Status: DISCONTINUED | OUTPATIENT
Start: 2018-04-15 | End: 2018-04-15

## 2018-04-15 RX ORDER — FLUTICASONE PROPIONATE 50 MCG
1 SPRAY, SUSPENSION NASAL
Qty: 0 | Refills: 0 | Status: DISCONTINUED | OUTPATIENT
Start: 2018-04-15 | End: 2018-04-22

## 2018-04-15 RX ADMIN — Medication 300 MILLIGRAM(S): at 10:06

## 2018-04-15 RX ADMIN — HYDROMORPHONE HYDROCHLORIDE 30 MILLILITER(S): 2 INJECTION INTRAMUSCULAR; INTRAVENOUS; SUBCUTANEOUS at 19:06

## 2018-04-15 RX ADMIN — Medication 100 MILLIGRAM(S): at 21:42

## 2018-04-15 RX ADMIN — Medication 10 MILLIGRAM(S): at 10:07

## 2018-04-15 RX ADMIN — ENOXAPARIN SODIUM 60 MILLIGRAM(S): 100 INJECTION SUBCUTANEOUS at 17:30

## 2018-04-15 RX ADMIN — Medication 300 MILLIGRAM(S): at 21:42

## 2018-04-15 RX ADMIN — Medication 100 MILLIGRAM(S): at 14:11

## 2018-04-15 RX ADMIN — Medication 325 MILLIGRAM(S): at 09:22

## 2018-04-15 RX ADMIN — Medication 10 MILLIGRAM(S): at 14:07

## 2018-04-15 RX ADMIN — Medication 10 MILLIGRAM(S): at 05:50

## 2018-04-15 RX ADMIN — CLOPIDOGREL BISULFATE 75 MILLIGRAM(S): 75 TABLET, FILM COATED ORAL at 09:22

## 2018-04-15 RX ADMIN — Medication 20 MILLIGRAM(S): at 06:15

## 2018-04-15 RX ADMIN — Medication 15 MILLIGRAM(S): at 00:22

## 2018-04-15 RX ADMIN — Medication 0.5 MILLIGRAM(S): at 17:58

## 2018-04-15 RX ADMIN — ENOXAPARIN SODIUM 60 MILLIGRAM(S): 100 INJECTION SUBCUTANEOUS at 09:23

## 2018-04-15 RX ADMIN — Medication 10 MILLIGRAM(S): at 14:10

## 2018-04-15 RX ADMIN — Medication 0.1 MILLIGRAM(S): at 04:30

## 2018-04-15 RX ADMIN — Medication 3 MILLILITER(S): at 11:46

## 2018-04-15 RX ADMIN — Medication 20 MILLIGRAM(S): at 05:50

## 2018-04-15 RX ADMIN — Medication 5 MILLIGRAM(S): at 00:50

## 2018-04-15 RX ADMIN — Medication 0.5 MILLIGRAM(S): at 05:28

## 2018-04-15 RX ADMIN — Medication 15 MILLIGRAM(S): at 00:52

## 2018-04-15 RX ADMIN — Medication 5 MILLIGRAM(S): at 05:51

## 2018-04-15 RX ADMIN — Medication 1 SPRAY(S): at 21:42

## 2018-04-15 RX ADMIN — HYDROMORPHONE HYDROCHLORIDE 30 MILLILITER(S): 2 INJECTION INTRAMUSCULAR; INTRAVENOUS; SUBCUTANEOUS at 07:15

## 2018-04-15 RX ADMIN — Medication 3 MILLILITER(S): at 23:35

## 2018-04-15 RX ADMIN — Medication 50 MILLIGRAM(S): at 06:15

## 2018-04-15 RX ADMIN — PANTOPRAZOLE SODIUM 40 MILLIGRAM(S): 20 TABLET, DELAYED RELEASE ORAL at 09:24

## 2018-04-15 RX ADMIN — Medication 3 MILLILITER(S): at 05:28

## 2018-04-15 RX ADMIN — Medication 3 MILLILITER(S): at 17:57

## 2018-04-15 NOTE — PROGRESS NOTE ADULT - SUBJECTIVE AND OBJECTIVE BOX
WILLIE CLEARY  MRN-[id number]    Admission HPI:  As per [facility] EMR: 38yo male with pmh of HTN not on any meds anymore, current every day smoker presents with chest pain for 2 hours. Pt states 2 hours ago he was working out then took a hot shower and started to have severe chest pain, radiating down the abdomen with nausea and diaphoresis. Pt states worse with exertion. Pt denies v/d, urinary symptoms, loc, recent travel and sickness. Denies cocaine use endorses marijuana use. Pt denies family ho of cardiac disease.  On CTA, found to have: Ascending aortic aneurysm measuring 6 cm. 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 There is   extension into the brachiocephalic artery with nonopacification of the right common carotid artery concerning for acute occlusion. There is also extension into the proximal portion of the left subclavian artery. Dissection extends down into the descending aorta and ends at the level of the renal arteries. There is extension of the dissection into the celiac and superior mesenteric artery. 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 The inferior mesenteric artery is patent.  Transferred to [facility] for emergent surgical repair. (11 Apr 2018 03:59)      surgery/Hospital course  4/11 OR: Biobental for repair of acute type A aortic ic dissection   post op hypoxemia, required bipap and high flow oxygen, on 100% titrated down.   mild incisional pain on PCA;  with severe hypertension,      PAST MEDICAL & SURGICAL HISTORY:  HTN (hypertension)  No significant past surgical history    Allergies No Known Allergies    PHYSICAL EXAM:  T(C): 37.4, Max: 38 (04-14-18 @ 12:00)  HR: 80 (67 - 80)  BP: 148/70 (126/78 - 152/70)  RR: 20 (11 - 27)  SpO2: 94% (90% - 100%)  Gen:  Awake, alert,on high flow oxygen  CNS:A&O x3, No focal deficit                                                                                                                                       Eyes:PERRL                                            ENT/Neck: no JVD	  RES :+Breath sounds , No rhonchi, No wheezing  CVS: regular rhythm. Normal S1/S2. No Murmur;   Abd: soft; not distented; No Tenderness. Bowel sounds present .   Extremities:No edema  Skin: No rash  Psych: No lethargy .    MEDICATIONS  (STANDING):  ALBUTerol/ipratropium for Nebulization 3 milliLiter(s) Nebulizer every 6 hours  aspirin enteric coated 325 milliGRAM(s) Oral daily  buDESOnide   0.5 milliGRAM(s) Respule 0.5 milliGRAM(s) Inhalation every 12 hours  clopidogrel Tablet 75 milliGRAM(s) Oral daily  dextrose 5%. 1000 milliLiter(s) (50 mL/Hr) IV Continuous <Continuous>  dextrose 50% Injectable 12.5 Gram(s) IV Push once  dextrose 50% Injectable 25 Gram(s) IV Push once  dextrose 50% Injectable 25 Gram(s) IV Push once  docusate sodium 100 milliGRAM(s) Oral three times a day  enoxaparin Injectable 60 milliGRAM(s) SubCutaneous every 12 hours  HYDROmorphone  Injectable 1 milliGRAM(s) IV Push once  HYDROmorphone PCA (1 mG/mL) 30 milliLiter(s) PCA Continuous PCA Continuous  insulin lispro (HumaLOG) corrective regimen sliding scale   SubCutaneous three times a day before meals  insulin lispro (HumaLOG) corrective regimen sliding scale   SubCutaneous at bedtime  ketorolac   Injectable 15 milliGRAM(s) IV Push every 8 hours  metoclopramide Injectable 10 milliGRAM(s) IV Push every 8 hours  metoprolol tartrate 25 milliGRAM(s) Oral every 8 hours  pantoprazole    Tablet 40 milliGRAM(s) Oral before breakfast  sodium chloride 0.9%. 1000 milliLiter(s) (30 mL/Hr) IV Continuous <Continuous>    MEDICATIONS  (PRN):  dextrose Gel 1 Dose(s) Oral once PRN Blood Glucose LESS THAN 70 milliGRAM(s)/deciLiter  glucagon  Injectable 1 milliGRAM(s) IntraMuscular once PRN Glucose <70 milliGRAM(s)/deciLiter  HYDROmorphone PCA (1 mG/mL) Rescue Clinician Bolus 0.5 milliGRAM(s) IV Push every 15 minutes PRN for Pain Scale GREATER THAN 6  naloxone Injectable 0.1 milliGRAM(s) IV Push every 3 minutes PRN For ANY of the following changes in patient status:  A. RR LESS THAN 10 breaths per minute, B. Oxygen saturation LESS THAN 90%, C. Sedation score of 6  ondansetron Injectable 4 milliGRAM(s) IV Push every 6 hours PRN Nausea        Labs:                        9.9    8.5   )-----------( 118                   27.6         139  |  107  |  25  ----------------------------<  105  4.7   |  22  |  1.06  Ca    7.7     Phos  3.2       TPro  5.2  /  Alb  2.9  /  TBili  0.3  /  DBili  x   /  AST  59  /  ALT  35  /  AlkPhos  27    LIVER FUNCTIONS -   Alb: 2.9 g/dL / Pro: 5.2 g/dL / ALK PHOS: 27 U/L / ALT: 35 U/L RC / AST: 59 U/L / GGT: x         PT/INR -  PT: 14.7 sec;   INR: 1.34 ratio       PTT - PTT:32.2 sec  ABG - pH: 7.40  /  pCO2: 40    /  pO2: 84    / HCO3: 24    / Base Excess: -.5   /  SaO2: 97        ASSESMENT :  4/11/2018  BioBental for  acute type A dissection   hypertensive emergency  acute hypoxemic respiratory failure  thrombocytopenia  cigarette smoking, nicotine dependencr=e================================================================================================================================  PLAN:   =Neuro: analgesics   HYDROmorphone  Injectable 1 IV Push once  HYDROmorphone PCA (1 mG/mL) 30 PCA Continuous PCA Continuous  HYDROmorphone PCA (1 mG/mL) Rescue Clinician Bolus 0.5 IV Push every 15 minutes PRN  ketorolac   Injectable 15 IV Push every 8 hours  metoclopramide Injectable 10 IV Push every 8 hours  ondansetron Injectable 4 IV Push every 6 hours PRN    = Respiratory: Brocnhodilators, inhaled steroid, respiratory support   ALBUTerol/ipratropium for Nebulization 3 milliLiter(s) every 6 hours  buDESOnide   0.5 milliGRAM(s) Respule 0.5 milliGRAM(s) every 12 hours    =CVS/Hem:  metoprolol tartrate 25 Oral every 8 hours  aspirin enteric coated 325 Oral daily  clopidogrel Tablet 75 Oral daily  enoxaparin Injectable 60 SubCutaneous every 12 hours    =Renal: Swartz:  continue to  Monitor I/Os   =GI: GI prophylaxis;Feeding : @  ml/hr  dextrose 5%. 1000 IV Continuous <Continuous>  docusate sodium 100 Oral three times a day  pantoprazole    Tablet 40 Oral before breakfast  sodium chloride 0.9%. 1000 IV Continuous <Continuous>    =Endo: Glycemic control;   insulin lispro (HumaLOG) corrective regimen sliding scale  SubCutaneous three times a day before meals  insulin lispro (HumaLOG) corrective regimen sliding scale  SubCutaneous at bedtime    Patient requires continuous monitoring with bedside rhythm monitoring, arterial line, pulse oximetry, ;intermittent blood gas analysis.  Care plan discussed with ICU care team.  patient remain critical; required more than usual post op care; I have spent 80 minutes providing non routine post op care.

## 2018-04-15 NOTE — PROGRESS NOTE ADULT - SUBJECTIVE AND OBJECTIVE BOX
Day __ of Anesthesia Pain Management Service    SUBJECTIVE: Patient is doing well with IV PCA    Pain Scale Score:	[X] Refer to charted pain scores    THERAPY:    [ ] IV PCA Morphine		[ ] 5 mg/mL	[ ] 1 mg/mL  [X] IV PCA Hydromorphone	[ ] 5 mg/mL	[X] 1 mg/mL  [ ] IV PCA Fentanyl		[ ] 50 micrograms/mL    Demand dose: 0.2 mg     Lockout: 6 minutes   Continuous Rate: 0 mg/hr  4 Hour Limit: 4 mg    MEDICATIONS  (STANDING):  ALBUTerol/ipratropium for Nebulization 3 milliLiter(s) Nebulizer every 6 hours  aspirin enteric coated 325 milliGRAM(s) Oral daily  buDESOnide   0.5 milliGRAM(s) Respule 0.5 milliGRAM(s) Inhalation every 12 hours  clopidogrel Tablet 75 milliGRAM(s) Oral daily  dextrose 5%. 1000 milliLiter(s) (50 mL/Hr) IV Continuous <Continuous>  dextrose 50% Injectable 12.5 Gram(s) IV Push once  dextrose 50% Injectable 25 Gram(s) IV Push once  dextrose 50% Injectable 25 Gram(s) IV Push once  docusate sodium 100 milliGRAM(s) Oral three times a day  enoxaparin Injectable 60 milliGRAM(s) SubCutaneous every 12 hours  HYDROmorphone  Injectable 1 milliGRAM(s) IV Push once  HYDROmorphone PCA (1 mG/mL) 30 milliLiter(s) PCA Continuous PCA Continuous  insulin lispro (HumaLOG) corrective regimen sliding scale   SubCutaneous three times a day before meals  insulin lispro (HumaLOG) corrective regimen sliding scale   SubCutaneous at bedtime  labetalol 300 milliGRAM(s) Oral every 8 hours  metoclopramide Injectable 10 milliGRAM(s) IV Push every 8 hours  niCARdipine Infusion 3 mG/Hr (15 mL/Hr) IV Continuous <Continuous>  pantoprazole    Tablet 40 milliGRAM(s) Oral before breakfast  sodium chloride 0.9%. 1000 milliLiter(s) (30 mL/Hr) IV Continuous <Continuous>    MEDICATIONS  (PRN):  dextrose Gel 1 Dose(s) Oral once PRN Blood Glucose LESS THAN 70 milliGRAM(s)/deciLiter  glucagon  Injectable 1 milliGRAM(s) IntraMuscular once PRN Glucose <70 milliGRAM(s)/deciLiter  HYDROmorphone PCA (1 mG/mL) Rescue Clinician Bolus 0.5 milliGRAM(s) IV Push every 15 minutes PRN for Pain Scale GREATER THAN 6  naloxone Injectable 0.1 milliGRAM(s) IV Push every 3 minutes PRN For ANY of the following changes in patient status:  A. RR LESS THAN 10 breaths per minute, B. Oxygen saturation LESS THAN 90%, C. Sedation score of 6  ondansetron Injectable 4 milliGRAM(s) IV Push every 6 hours PRN Nausea      OBJECTIVE:    Sedation Score:	[ X] Alert	[ ] Drowsy 	[ ] Arousable	[ ] Asleep	[ ] Unresponsive    Side Effects:	[X ] None	[ ] Nausea	[ ] Vomiting	[ ] Pruritus  		[ ] Other:    Vital Signs Last 24 Hrs  T(C): 36.8 (15 Apr 2018 12:00), Max: 37.4 (14 Apr 2018 16:00)  T(F): 98.2 (15 Apr 2018 12:00), Max: 99.4 (14 Apr 2018 16:00)  HR: 73 (15 Apr 2018 13:00) (62 - 87)  BP: 140/66 (15 Apr 2018 13:00) (122/60 - 156/73)  BP(mean): 95 (15 Apr 2018 13:00) (0 - 105)  RR: 20 (15 Apr 2018 13:00) (8 - 35)  SpO2: 97% (15 Apr 2018 13:00) (89% - 100%)    ASSESSMENT/ PLAN    Therapy to  be:               [X] Continued   [ ] Discontinued   [ ] Changed to PRN Analgesics    Documentation and Verification of current medications:   [X] Done	[ ] Not done, not eligible    Comments:

## 2018-04-16 DIAGNOSIS — Z98.890 OTHER SPECIFIED POSTPROCEDURAL STATES: ICD-10-CM

## 2018-04-16 LAB
ALBUMIN SERPL ELPH-MCNC: 3 G/DL — LOW (ref 3.3–5)
ALP SERPL-CCNC: 37 U/L — LOW (ref 40–120)
ALT FLD-CCNC: 37 U/L RC — SIGNIFICANT CHANGE UP (ref 10–45)
ANION GAP SERPL CALC-SCNC: 12 MMOL/L — SIGNIFICANT CHANGE UP (ref 5–17)
AST SERPL-CCNC: 31 U/L — SIGNIFICANT CHANGE UP (ref 10–40)
BILIRUB SERPL-MCNC: 0.4 MG/DL — SIGNIFICANT CHANGE UP (ref 0.2–1.2)
BUN SERPL-MCNC: 15 MG/DL — SIGNIFICANT CHANGE UP (ref 7–23)
CALCIUM SERPL-MCNC: 8.3 MG/DL — LOW (ref 8.4–10.5)
CHLORIDE SERPL-SCNC: 101 MMOL/L — SIGNIFICANT CHANGE UP (ref 96–108)
CO2 SERPL-SCNC: 24 MMOL/L — SIGNIFICANT CHANGE UP (ref 22–31)
CREAT SERPL-MCNC: 0.73 MG/DL — SIGNIFICANT CHANGE UP (ref 0.5–1.3)
GAS PNL BLDA: SIGNIFICANT CHANGE UP
GLUCOSE SERPL-MCNC: 112 MG/DL — HIGH (ref 70–99)
HCT VFR BLD CALC: 26.8 % — LOW (ref 39–50)
HGB BLD-MCNC: 9.5 G/DL — LOW (ref 13–17)
INR BLD: 1.17 RATIO — HIGH (ref 0.88–1.16)
MCHC RBC-ENTMCNC: 33.6 PG — SIGNIFICANT CHANGE UP (ref 27–34)
MCHC RBC-ENTMCNC: 35.6 GM/DL — SIGNIFICANT CHANGE UP (ref 32–36)
MCV RBC AUTO: 94.5 FL — SIGNIFICANT CHANGE UP (ref 80–100)
PHOSPHATE SERPL-MCNC: 3.9 MG/DL — SIGNIFICANT CHANGE UP (ref 2.5–4.5)
PLATELET # BLD AUTO: 135 K/UL — LOW (ref 150–400)
POTASSIUM SERPL-MCNC: 4.4 MMOL/L — SIGNIFICANT CHANGE UP (ref 3.5–5.3)
POTASSIUM SERPL-SCNC: 4.4 MMOL/L — SIGNIFICANT CHANGE UP (ref 3.5–5.3)
PROT SERPL-MCNC: 5.3 G/DL — LOW (ref 6–8.3)
PROTHROM AB SERPL-ACNC: 12.8 SEC — HIGH (ref 9.8–12.7)
RBC # BLD: 2.84 M/UL — LOW (ref 4.2–5.8)
RBC # FLD: 11.9 % — SIGNIFICANT CHANGE UP (ref 10.3–14.5)
SODIUM SERPL-SCNC: 137 MMOL/L — SIGNIFICANT CHANGE UP (ref 135–145)
WBC # BLD: 6.1 K/UL — SIGNIFICANT CHANGE UP (ref 3.8–10.5)
WBC # FLD AUTO: 6.1 K/UL — SIGNIFICANT CHANGE UP (ref 3.8–10.5)

## 2018-04-16 PROCEDURE — 99291 CRITICAL CARE FIRST HOUR: CPT

## 2018-04-16 PROCEDURE — 71045 X-RAY EXAM CHEST 1 VIEW: CPT | Mod: 26

## 2018-04-16 RX ORDER — LABETALOL HCL 100 MG
400 TABLET ORAL EVERY 8 HOURS
Qty: 0 | Refills: 0 | Status: DISCONTINUED | OUTPATIENT
Start: 2018-04-16 | End: 2018-04-16

## 2018-04-16 RX ORDER — LABETALOL HCL 100 MG
10 TABLET ORAL ONCE
Qty: 0 | Refills: 0 | Status: COMPLETED | OUTPATIENT
Start: 2018-04-16 | End: 2018-04-16

## 2018-04-16 RX ORDER — HYDROMORPHONE HYDROCHLORIDE 2 MG/ML
1 INJECTION INTRAMUSCULAR; INTRAVENOUS; SUBCUTANEOUS ONCE
Qty: 0 | Refills: 0 | Status: DISCONTINUED | OUTPATIENT
Start: 2018-04-16 | End: 2018-04-16

## 2018-04-16 RX ORDER — WARFARIN SODIUM 2.5 MG/1
5 TABLET ORAL ONCE
Qty: 0 | Refills: 0 | Status: COMPLETED | OUTPATIENT
Start: 2018-04-16 | End: 2018-04-16

## 2018-04-16 RX ORDER — ENOXAPARIN SODIUM 100 MG/ML
40 INJECTION SUBCUTANEOUS EVERY 12 HOURS
Qty: 0 | Refills: 0 | Status: DISCONTINUED | OUTPATIENT
Start: 2018-04-17 | End: 2018-04-22

## 2018-04-16 RX ORDER — OXYCODONE HYDROCHLORIDE 5 MG/1
5 TABLET ORAL
Qty: 0 | Refills: 0 | Status: DISCONTINUED | OUTPATIENT
Start: 2018-04-16 | End: 2018-04-22

## 2018-04-16 RX ORDER — LABETALOL HCL 100 MG
10 TABLET ORAL
Qty: 0 | Refills: 0 | Status: COMPLETED | OUTPATIENT
Start: 2018-04-16 | End: 2018-04-16

## 2018-04-16 RX ORDER — LABETALOL HCL 100 MG
600 TABLET ORAL EVERY 8 HOURS
Qty: 0 | Refills: 0 | Status: DISCONTINUED | OUTPATIENT
Start: 2018-04-16 | End: 2018-04-22

## 2018-04-16 RX ORDER — KETOROLAC TROMETHAMINE 30 MG/ML
30 SYRINGE (ML) INJECTION ONCE
Qty: 0 | Refills: 0 | Status: DISCONTINUED | OUTPATIENT
Start: 2018-04-16 | End: 2018-04-16

## 2018-04-16 RX ORDER — OXYCODONE HYDROCHLORIDE 5 MG/1
10 TABLET ORAL
Qty: 0 | Refills: 0 | Status: DISCONTINUED | OUTPATIENT
Start: 2018-04-16 | End: 2018-04-22

## 2018-04-16 RX ADMIN — Medication 0.5 MILLIGRAM(S): at 06:14

## 2018-04-16 RX ADMIN — Medication 600 MILLIGRAM(S): at 13:03

## 2018-04-16 RX ADMIN — WARFARIN SODIUM 5 MILLIGRAM(S): 2.5 TABLET ORAL at 21:38

## 2018-04-16 RX ADMIN — Medication 3 MILLILITER(S): at 06:13

## 2018-04-16 RX ADMIN — HYDROMORPHONE HYDROCHLORIDE 1 MILLIGRAM(S): 2 INJECTION INTRAMUSCULAR; INTRAVENOUS; SUBCUTANEOUS at 04:05

## 2018-04-16 RX ADMIN — Medication 600 MILLIGRAM(S): at 06:00

## 2018-04-16 RX ADMIN — OXYCODONE HYDROCHLORIDE 10 MILLIGRAM(S): 5 TABLET ORAL at 18:14

## 2018-04-16 RX ADMIN — OXYCODONE HYDROCHLORIDE 10 MILLIGRAM(S): 5 TABLET ORAL at 11:45

## 2018-04-16 RX ADMIN — Medication 0.5 MILLIGRAM(S): at 17:16

## 2018-04-16 RX ADMIN — Medication 30 MILLIGRAM(S): at 04:00

## 2018-04-16 RX ADMIN — Medication 3 MILLILITER(S): at 15:38

## 2018-04-16 RX ADMIN — Medication 10 MILLIGRAM(S): at 02:48

## 2018-04-16 RX ADMIN — HYDROMORPHONE HYDROCHLORIDE 30 MILLILITER(S): 2 INJECTION INTRAMUSCULAR; INTRAVENOUS; SUBCUTANEOUS at 07:37

## 2018-04-16 RX ADMIN — Medication 0.1 MILLIGRAM(S): at 21:38

## 2018-04-16 RX ADMIN — ENOXAPARIN SODIUM 60 MILLIGRAM(S): 100 INJECTION SUBCUTANEOUS at 06:19

## 2018-04-16 RX ADMIN — OXYCODONE HYDROCHLORIDE 10 MILLIGRAM(S): 5 TABLET ORAL at 22:10

## 2018-04-16 RX ADMIN — HYDROMORPHONE HYDROCHLORIDE 1 MILLIGRAM(S): 2 INJECTION INTRAMUSCULAR; INTRAVENOUS; SUBCUTANEOUS at 04:20

## 2018-04-16 RX ADMIN — CLOPIDOGREL BISULFATE 75 MILLIGRAM(S): 75 TABLET, FILM COATED ORAL at 12:19

## 2018-04-16 RX ADMIN — Medication 100 MILLIGRAM(S): at 13:03

## 2018-04-16 RX ADMIN — Medication 3 MILLILITER(S): at 12:29

## 2018-04-16 RX ADMIN — OXYCODONE HYDROCHLORIDE 10 MILLIGRAM(S): 5 TABLET ORAL at 11:00

## 2018-04-16 RX ADMIN — Medication 0.1 MILLIGRAM(S): at 13:03

## 2018-04-16 RX ADMIN — ENOXAPARIN SODIUM 60 MILLIGRAM(S): 100 INJECTION SUBCUTANEOUS at 17:11

## 2018-04-16 RX ADMIN — OXYCODONE HYDROCHLORIDE 10 MILLIGRAM(S): 5 TABLET ORAL at 14:12

## 2018-04-16 RX ADMIN — HYDROMORPHONE HYDROCHLORIDE 30 MILLILITER(S): 2 INJECTION INTRAMUSCULAR; INTRAVENOUS; SUBCUTANEOUS at 09:36

## 2018-04-16 RX ADMIN — Medication 10 MILLIGRAM(S): at 03:28

## 2018-04-16 RX ADMIN — Medication 1 SPRAY(S): at 17:15

## 2018-04-16 RX ADMIN — Medication 10 MILLIGRAM(S): at 06:55

## 2018-04-16 RX ADMIN — Medication 0.1 MILLIGRAM(S): at 09:08

## 2018-04-16 RX ADMIN — OXYCODONE HYDROCHLORIDE 10 MILLIGRAM(S): 5 TABLET ORAL at 18:48

## 2018-04-16 RX ADMIN — Medication 100 MILLIGRAM(S): at 21:38

## 2018-04-16 RX ADMIN — Medication 10 MILLIGRAM(S): at 05:45

## 2018-04-16 RX ADMIN — Medication 100 MILLIGRAM(S): at 06:19

## 2018-04-16 RX ADMIN — Medication 30 MILLIGRAM(S): at 03:45

## 2018-04-16 RX ADMIN — Medication 600 MILLIGRAM(S): at 21:38

## 2018-04-16 RX ADMIN — PANTOPRAZOLE SODIUM 40 MILLIGRAM(S): 20 TABLET, DELAYED RELEASE ORAL at 06:19

## 2018-04-16 RX ADMIN — Medication 1 SPRAY(S): at 06:19

## 2018-04-16 RX ADMIN — OXYCODONE HYDROCHLORIDE 10 MILLIGRAM(S): 5 TABLET ORAL at 21:38

## 2018-04-16 RX ADMIN — Medication 10 MILLIGRAM(S): at 02:30

## 2018-04-16 RX ADMIN — Medication 325 MILLIGRAM(S): at 12:19

## 2018-04-16 RX ADMIN — OXYCODONE HYDROCHLORIDE 10 MILLIGRAM(S): 5 TABLET ORAL at 15:04

## 2018-04-16 NOTE — PROGRESS NOTE ADULT - SUBJECTIVE AND OBJECTIVE BOX
Vascular Surgery Daily Progress Note    S: feels comfortable, left hand numbness improved, motor intact     O: Vital Signs Last 24 Hrs  T(C): 36.7 (16 Apr 2018 08:00), Max: 36.8 (15 Apr 2018 12:00)  T(F): 98.1 (16 Apr 2018 08:00), Max: 98.2 (15 Apr 2018 12:00)  HR: 63 (16 Apr 2018 09:00) (63 - 85)  BP: 121/67 (16 Apr 2018 09:00) (114/78 - 163/86)  BP(mean): 87 (16 Apr 2018 09:00) (76 - 116)  RR: 21 (16 Apr 2018 09:00) (15 - 35)  SpO2: 100% (16 Apr 2018 09:00) (89% - 100%)    Gen: NAD  Ext: palp left radial/ulnar, motor intact, sensation intact     LABS:  ABG - ( 16 Apr 2018 02:33 )  pH: 7.41  /  pCO2: 39    /  pO2: 154   / HCO3: 24    / Base Excess: .1    /  SaO2: 99                                  9.5    6.1   )-----------( 135      ( 16 Apr 2018 01:49 )             26.8       04-16    137  |  101  |  15  ----------------------------<  112<H>  4.4   |  24  |  0.73    Ca    8.3<L>      16 Apr 2018 01:49  Phos  3.9     04-16    TPro  5.3<L>  /  Alb  3.0<L>  /  TBili  0.4  /  DBili  x   /  AST  31  /  ALT  37  /  AlkPhos  37<L>  04-16        Assessment: 37 year old male, post op day 5, s/p  Type  A dissection repair. His left  hand numbness is improving.     - Recommend  anticoagulation with if possible. Can be  switched to antiplatelet  therapy    - Please call back with any questions     [id number]

## 2018-04-16 NOTE — PROGRESS NOTE ADULT - SUBJECTIVE AND OBJECTIVE BOX
WILLIE CLEARY  MRN-[id number]    Admission HPI:  As per [facility] EMR: 36yo male with pmh of HTN not on any meds anymore, current every day smoker presents with chest pain for 2 hours. Pt states 2 hours ago he was working out then took a hot shower and started to have severe chest pain, radiating down the abdomen with nausea and diaphoresis. Pt states worse with exertion. Pt denies v/d, urinary symptoms, loc, recent travel and sickness. Denies cocaine use endorses marijuana use. Pt denies family ho of cardiac disease.  On CTA, found to have: Ascending aortic aneurysm measuring 6 cm. 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 There is   extension into the brachiocephalic artery with nonopacification of the right common carotid artery concerning for acute occlusion. There is also extension into the proximal portion of the left subclavian artery. Dissection extends down into the descending aorta and ends at the level of the renal arteries. There is extension of the dissection into the celiac and superior mesenteric artery. 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 The inferior mesenteric artery is patent.  Transferred to [facility] for emergent surgical repair. (11 Apr 2018 03:59)      surgery/Hospital course  4/11 OR: Biobental for repair of acute type A aortic ic dissection   post op hypoxemia, required bipap and high flow oxygen, on 100% titrated down.   mild incisional pain on PCA;  with severe hypertension,      PAST MEDICAL & SURGICAL HISTORY:  HTN (hypertension)  No significant past surgical history    Allergies No Known Allergies    PHYSICAL EXAM:  T(C): 37.4, Max: 38 (04-14-18 @ 12:00)  HR: 80 (67 - 80)  BP: 148/70 (126/78 - 152/70)  RR: 20 (11 - 27)  SpO2: 94% (90% - 100%)  Gen:  Awake, alert,on high flow oxygen  CNS:A&O x3, No focal deficit                                                                                                                                       Eyes:PERRL                                            ENT/Neck: no JVD	  RES :+Breath sounds , No rhonchi, No wheezing  CVS: regular rhythm. Normal S1/S2. No Murmur;   Abd: soft; not distented; No Tenderness. Bowel sounds present .   Extremities:No edema  Skin: No rash  Psych: No lethargy .    MEDICATIONS  (STANDING):  ALBUTerol/ipratropium for Nebulization 3 milliLiter(s) Nebulizer every 6 hours  aspirin enteric coated 325 milliGRAM(s) Oral daily  buDESOnide   0.5 milliGRAM(s) Respule 0.5 milliGRAM(s) Inhalation every 12 hours  clopidogrel Tablet 75 milliGRAM(s) Oral daily  dextrose 5%. 1000 milliLiter(s) (50 mL/Hr) IV Continuous <Continuous>  dextrose 50% Injectable 12.5 Gram(s) IV Push once  dextrose 50% Injectable 25 Gram(s) IV Push once  dextrose 50% Injectable 25 Gram(s) IV Push once  docusate sodium 100 milliGRAM(s) Oral three times a day  enoxaparin Injectable 60 milliGRAM(s) SubCutaneous every 12 hours  HYDROmorphone  Injectable 1 milliGRAM(s) IV Push once  HYDROmorphone PCA (1 mG/mL) 30 milliLiter(s) PCA Continuous PCA Continuous  insulin lispro (HumaLOG) corrective regimen sliding scale   SubCutaneous three times a day before meals  insulin lispro (HumaLOG) corrective regimen sliding scale   SubCutaneous at bedtime  ketorolac   Injectable 15 milliGRAM(s) IV Push every 8 hours  metoclopramide Injectable 10 milliGRAM(s) IV Push every 8 hours  metoprolol tartrate 25 milliGRAM(s) Oral every 8 hours  pantoprazole    Tablet 40 milliGRAM(s) Oral before breakfast  sodium chloride 0.9%. 1000 milliLiter(s) (30 mL/Hr) IV Continuous <Continuous>    MEDICATIONS  (PRN):  dextrose Gel 1 Dose(s) Oral once PRN Blood Glucose LESS THAN 70 milliGRAM(s)/deciLiter  glucagon  Injectable 1 milliGRAM(s) IntraMuscular once PRN Glucose <70 milliGRAM(s)/deciLiter  HYDROmorphone PCA (1 mG/mL) Rescue Clinician Bolus 0.5 milliGRAM(s) IV Push every 15 minutes PRN for Pain Scale GREATER THAN 6  naloxone Injectable 0.1 milliGRAM(s) IV Push every 3 minutes PRN For ANY of the following changes in patient status:  A. RR LESS THAN 10 breaths per minute, B. Oxygen saturation LESS THAN 90%, C. Sedation score of 6  ondansetron Injectable 4 milliGRAM(s) IV Push every 6 hours PRN Nausea        Labs:                        9.9    8.5   )-----------( 118                   27.6         139  |  107  |  25  ----------------------------<  105  4.7   |  22  |  1.06  Ca    7.7     Phos  3.2       TPro  5.2  /  Alb  2.9  /  TBili  0.3  /  DBili  x   /  AST  59  /  ALT  35  /  AlkPhos  27    LIVER FUNCTIONS -   Alb: 2.9 g/dL / Pro: 5.2 g/dL / ALK PHOS: 27 U/L / ALT: 35 U/L RC / AST: 59 U/L / GGT: x         PT/INR -  PT: 14.7 sec;   INR: 1.34 ratio       PTT - PTT:32.2 sec  ABG - pH: 7.40  /  pCO2: 40    /  pO2: 84    / HCO3: 24    / Base Excess: -.5   /  SaO2: 97        ASSESMENT :  4/11/2018  BioBental for  acute type A dissection   hypertensive emergency  acute hypoxemic respiratory failure  thrombocytopenia  cigarette smoking, nicotine dependencr=e================================================================================================================================  PLAN:   =Neuro: analgesics   HYDROmorphone  Injectable 1 IV Push once  HYDROmorphone PCA (1 mG/mL) 30 PCA Continuous PCA Continuous  HYDROmorphone PCA (1 mG/mL) Rescue Clinician Bolus 0.5 IV Push every 15 minutes PRN  ketorolac   Injectable 15 IV Push every 8 hours  metoclopramide Injectable 10 IV Push every 8 hours  ondansetron Injectable 4 IV Push every 6 hours PRN    = Respiratory: Brocnhodilators, inhaled steroid, respiratory support   ALBUTerol/ipratropium for Nebulization 3 milliLiter(s) every 6 hours  buDESOnide   0.5 milliGRAM(s) Respule 0.5 milliGRAM(s) every 12 hours    =CVS/Hem:  metoprolol tartrate 25 Oral every 8 hours  aspirin enteric coated 325 Oral daily  clopidogrel Tablet 75 Oral daily  enoxaparin Injectable 60 SubCutaneous every 12 hours    =Renal: Swartz:  continue to  Monitor I/Os   =GI: GI prophylaxis;Feeding : @  ml/hr  dextrose 5%. 1000 IV Continuous <Continuous>  docusate sodium 100 Oral three times a day  pantoprazole    Tablet 40 Oral before breakfast  sodium chloride 0.9%. 1000 IV Continuous <Continuous>    =Endo: Glycemic control;   insulin lispro (HumaLOG) corrective regimen sliding scale  SubCutaneous three times a day before meals  insulin lispro (HumaLOG) corrective regimen sliding scale  SubCutaneous at bedtime    Patient requires continuous monitoring with bedside rhythm monitoring, arterial line, pulse oximetry, ;intermittent blood gas analysis.  Care plan discussed with ICU care team.  patient remain critical; required more than usual post op care; I have spent 30 minutes providing non routine post op care.

## 2018-04-16 NOTE — PROGRESS NOTE ADULT - SUBJECTIVE AND OBJECTIVE BOX
Pain Management Attending Addendum    SUBJECTIVE: Patient doing well with IV PCA    Therapy:    [X] IV PCA         [ ] PRN Analgesics    OBJECTIVE:   [X] Pain appropriately controlled    [ ] Other:    Side Effects:  [X] None	             [ ] Nausea              [ ] Pruritis                	[ ] Other:    ASSESSMENT/PLAN:  Therapy changed to PRN analgesics    Comments:po meds

## 2018-04-16 NOTE — PROGRESS NOTE ADULT - SUBJECTIVE AND OBJECTIVE BOX
Day 4 of Anesthesia Pain Management Service    SUBJECTIVE: Patient is doing well with IV PCA    Pain Scale Score:	[X] Refer to charted pain scores    THERAPY:    [ ] IV PCA Morphine		[ ] 5 mg/mL	[ ] 1 mg/mL  [X] IV PCA Hydromorphone	[ ] 5 mg/mL	[X] 1 mg/mL  [ ] IV PCA Fentanyl		[ ] 50 micrograms/mL    Demand dose: 0.2 mg     Lockout: 6 minutes   Continuous Rate: 0 mg/hr  4 Hour Limit: 4 mg    MEDICATIONS  (STANDING):  ALBUTerol/ipratropium for Nebulization 3 milliLiter(s) Nebulizer every 6 hours  aspirin enteric coated 325 milliGRAM(s) Oral daily  buDESOnide   0.5 milliGRAM(s) Respule 0.5 milliGRAM(s) Inhalation every 12 hours  cloNIDine 0.1 milliGRAM(s) Oral every 8 hours  clopidogrel Tablet 75 milliGRAM(s) Oral daily  docusate sodium 100 milliGRAM(s) Oral three times a day  enoxaparin Injectable 60 milliGRAM(s) SubCutaneous every 12 hours  fluticasone propionate 50 MICROgram(s)/spray Nasal Spray 1 Spray(s) Both Nostrils two times a day  HYDROmorphone PCA (1 mG/mL) 30 milliLiter(s) PCA Continuous PCA Continuous  insulin lispro (HumaLOG) corrective regimen sliding scale   SubCutaneous three times a day before meals  insulin lispro (HumaLOG) corrective regimen sliding scale   SubCutaneous at bedtime  labetalol 600 milliGRAM(s) Oral every 8 hours  pantoprazole    Tablet 40 milliGRAM(s) Oral before breakfast  sodium chloride 0.9%. 1000 milliLiter(s) (30 mL/Hr) IV Continuous <Continuous>    MEDICATIONS  (PRN):  HYDROmorphone PCA (1 mG/mL) Rescue Clinician Bolus 0.5 milliGRAM(s) IV Push every 15 minutes PRN for Pain Scale GREATER THAN 6  naloxone Injectable 0.1 milliGRAM(s) IV Push every 3 minutes PRN For ANY of the following changes in patient status:  A. RR LESS THAN 10 breaths per minute, B. Oxygen saturation LESS THAN 90%, C. Sedation score of 6  ondansetron Injectable 4 milliGRAM(s) IV Push every 6 hours PRN Nausea      OBJECTIVE:    Sedation Score:	[ X] Alert	[ ] Drowsy 	[ ] Arousable	[ ] Asleep	[ ] Unresponsive    Side Effects:	[X ] None	[ ] Nausea	[ ] Vomiting	[ ] Pruritus  		[ ] Other:    Vital Signs Last 24 Hrs  T(C): 36.7 (16 Apr 2018 08:00), Max: 36.8 (15 Apr 2018 12:00)  T(F): 98.1 (16 Apr 2018 08:00), Max: 98.2 (15 Apr 2018 12:00)  HR: 63 (16 Apr 2018 09:00) (63 - 87)  BP: 121/67 (16 Apr 2018 09:00) (114/78 - 163/86)  BP(mean): 87 (16 Apr 2018 09:00) (76 - 116)  RR: 21 (16 Apr 2018 09:00) (15 - 35)  SpO2: 100% (16 Apr 2018 09:00) (89% - 100%)    ASSESSMENT/ PLAN    Therapy to  be:               [ ] Continued   [X ] Discontinued   [ X] Changed to PRN Analgesics    Documentation and Verification of current medications:   [X] Done	[ ] Not done, not eligible    Comments: Tolerating regular diet. D\C PCA change to PRN analgesics

## 2018-04-16 NOTE — PROGRESS NOTE ADULT - SUBJECTIVE AND OBJECTIVE BOX
VITAL SIGNS    Telemetry:      Vital Signs Last 24 Hrs  T(C): 36.6 (18 @ 15:23), Max: 36.7 (04-15-18 @ 20:00)  T(F): 97.8 (18 @ 15:23), Max: 98.1 (18 @ 08:00)  HR: 73 (18 @ 15:23) (63 - 79)  BP: 101/59 (18 @ 15:23) (101/59 - 163/86)  RR: 18 (18 @ 15:23) (14 - 30)  SpO2: 95% (18 @ 15:23) (93% - 100%)                   04-15 @ 07:01  -   @ 07:00  --------------------------------------------------------  IN: 1220 mL / OUT: 3700 mL / NET: -2480 mL     @ 07:01  -   @ 16:14  --------------------------------------------------------  IN: 540 mL / OUT: 2275 mL / NET: -1735 mL          Daily     Daily Weight in k.1 (2018 00:00)        CAPILLARY BLOOD GLUCOSE  115 (2018 12:00)  96 (2018 08:00)  123 (15 Apr 2018 22:00)      POCT Blood Glucose.: 96 mg/dL (2018 09:07)  POCT Blood Glucose.: 123 mg/dL (15 Apr 2018 21:53)                Coumadin    [ ] YES          [  ]      NO                                   PHYSICAL EXAM        Neurology: alert and oriented x 3, nonfocal, no gross deficits  CV : .S1S2 RRR  Sternal Wound :  CDI , Stable  Pacing Wires        [  ]   Settings:                                  Isolated  [  ]  Lungs: bibasilar crackles   Drains:     MS         [  ] Drainage:                 L Pleural  [  ]  Drainage:                R Pleural  [  ]  Drainage:  Abdomen: soft, nontender, nondistended, positive bowel sounds, last bowel movement   :         voiding    Extremities:               ALBUTerol/ipratropium for Nebulization 3 milliLiter(s) Nebulizer every 6 hours  aspirin enteric coated 325 milliGRAM(s) Oral daily  buDESOnide   0.5 milliGRAM(s) Respule 0.5 milliGRAM(s) Inhalation every 12 hours  cloNIDine 0.1 milliGRAM(s) Oral every 8 hours  docusate sodium 100 milliGRAM(s) Oral three times a day  enoxaparin Injectable 60 milliGRAM(s) SubCutaneous every 12 hours  fluticasone propionate 50 MICROgram(s)/spray Nasal Spray 1 Spray(s) Both Nostrils two times a day  insulin lispro (HumaLOG) corrective regimen sliding scale   SubCutaneous three times a day before meals  insulin lispro (HumaLOG) corrective regimen sliding scale   SubCutaneous at bedtime  labetalol 600 milliGRAM(s) Oral every 8 hours  oxyCODONE    IR 5 milliGRAM(s) Oral every 3 hours PRN  oxyCODONE    IR 10 milliGRAM(s) Oral every 3 hours PRN  pantoprazole    Tablet 40 milliGRAM(s) Oral before breakfast  sodium chloride 0.9%. 1000 milliLiter(s) IV Continuous <Continuous>  warfarin 5 milliGRAM(s) Oral once                    Physical Therapy Rec:   Home  [  ]   Home w/ PT  [  ]  Rehab  [  ]  Discussed with Cardiothoracic Team at AM rounds. im ok    VITAL SIGNS    Telemetry:  nstr 70    Vital Signs Last 24 Hrs  T(C): 36.6 (18 @ 15:23), Max: 36.7 (04-15-18 @ 20:00)  T(F): 97.8 (18 @ 15:23), Max: 98.1 (18 @ 08:00)  HR: 73 (18 @ 15:23) (63 - 79)  BP: 101/59 (18 @ 15:23) (101/59 - 163/86)  RR: 18 (18 @ 15:23) (14 - 30)  SpO2: 95% (18 @ 15:23) (93% - 100%)                   04-15 @ 07:01  -   @ 07:00  --------------------------------------------------------  IN: 1220 mL / OUT: 3700 mL / NET: -2480 mL     @ 07:01  -   @ 16:14  --------------------------------------------------------  IN: 540 mL / OUT: 2275 mL / NET: -1735 mL          Daily     Daily Weight in k.1 (2018 00:00)        CAPILLARY BLOOD GLUCOSE  115 (2018 12:00)  96 (2018 08:00)  123 (15 Apr 2018 22:00)      POCT Blood Glucose.: 96 mg/dL (2018 09:07)  POCT Blood Glucose.: 123 mg/dL (15 Apr 2018 21:53)                Coumadin    [ x] YES          [  ]      NO         carotid thrombus                          PHYSICAL EXAM        Neurology: alert and oriented x 3, nonfocal, no gross deficits  CV : .S1S2 RRR  Sternal Wound :  CDI , Stable  Pacing Wires        [ x ]   Settings:   [id number]                               Isolated  [  ]  Lungs: diminished bilat,  exp whreeze  Drains:     MS         [  ] Drainage:                 L Pleural  [  ]  Drainage:                R Pleural  [  ]  Drainage:  Abdomen: soft, nontender, nondistended, positive bowel sounds, last bowel movement   :         voiding    Extremities:       - edema - calve tenderness        ALBUTerol/ipratropium for Nebulization 3 milliLiter(s) Nebulizer every 6 hours  aspirin enteric coated 325 milliGRAM(s) Oral daily  buDESOnide   0.5 milliGRAM(s) Respule 0.5 milliGRAM(s) Inhalation every 12 hours  cloNIDine 0.1 milliGRAM(s) Oral every 8 hours  docusate sodium 100 milliGRAM(s) Oral three times a day  enoxaparin Injectable 60 milliGRAM(s) SubCutaneous every 12 hours  fluticasone propionate 50 MICROgram(s)/spray Nasal Spray 1 Spray(s) Both Nostrils two times a day  insulin lispro (HumaLOG) corrective regimen sliding scale   SubCutaneous three times a day before meals  insulin lispro (HumaLOG) corrective regimen sliding scale   SubCutaneous at bedtime  labetalol 600 milliGRAM(s) Oral every 8 hours  oxyCODONE    IR 5 milliGRAM(s) Oral every 3 hours PRN  oxyCODONE    IR 10 milliGRAM(s) Oral every 3 hours PRN  pantoprazole    Tablet 40 milliGRAM(s) Oral before breakfast  sodium chloride 0.9%. 1000 milliLiter(s) IV Continuous <Continuous>  warfarin 5 milliGRAM(s) Oral once                    Physical Therapy Rec:   Home  [  ]   Home w/ PT  [  ]  Rehab  [  ]  Discussed with Cardiothoracic Team at AM rounds.

## 2018-04-16 NOTE — PROGRESS NOTE ADULT - ASSESSMENT
As per [facility] EMR: 36yo male with pmh of HTN not on any meds anymore, current every day smoker presents with chest pain for 2 hours. Pt states 2 hours ago he was working out then took a hot shower and started to have severe chest pain, radiating down the abdomen with nausea and diaphoresis. Pt states worse with exertion. Pt denies v/d, urinary symptoms, loc, recent travel and sickness. Denies cocaine use endorses marijuana use. Pt denies family ho of cardiac disease.  On CTA, found to have: Ascending aortic aneurysm measuring 6 cm. 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 There is   extension into the brachiocephalic artery with nonopacification of the right common carotid artery concerning for acute occlusion. There is also extension into the proximal portion of the left subclavian artery. Dissection extends down into the descending aorta and ends at the level of the renal arteries. There is extension of the dissection into the celiac and superior mesenteric artery. 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 The inferior mesenteric artery is patent.  Transferred to [facility] for emergent surgical repair.   04/11/2018  Bio Bentall  AVR #27  w/ 34 hemoshield graft   + inotropes  extubated d 1 As per [facility] EMR: 38yo male with pmh of HTN not on any meds anymore, current every day smoker presents with chest pain for 2 hours. Pt states 2 hours ago he was working out then took a hot shower and started to have severe chest pain, radiating down the abdomen with nausea and diaphoresis. Pt states worse with exertion. Pt denies v/d, urinary symptoms, loc, recent travel and sickness. Denies cocaine use endorses marijuana use. Pt denies family ho of cardiac disease.  On CTA, found to have: Ascending aortic aneurysm measuring 6 cm. 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 There is   extension into the brachiocephalic artery with nonopacification of the right common carotid artery concerning for acute occlusion. There is also extension into the proximal portion of the left subclavian artery. Dissection extends down into the descending aorta and ends at the level of the renal arteries. There is extension of the dissection into the celiac and superior mesenteric artery. 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 The inferior mesenteric artery is patent.  Transferred to [facility] for emergent surgical repair.   04/11/2018  Bio Bentall  AVR #27  w/ 34 hemoshield graft   + inotropes  extubated d 1  Vascular consult for L  hand numbness: Patient is seen He  is post op  Type  A dissection repair , complaining of left  hand finger numbness. CTA and US  reviewed   dissection flap in the right CCA extending into the ICA ,Thrombus  seen  . Recommend  anticoagulation with  Heparin  if possible    Post op hypoxemia, required bipap and high flow oxygen, on 100% titrated down.    with severe hypertension,    Anticoagulation started for carotid thrombus

## 2018-04-17 LAB
ANION GAP SERPL CALC-SCNC: 14 MMOL/L — SIGNIFICANT CHANGE UP (ref 5–17)
BUN SERPL-MCNC: 13 MG/DL — SIGNIFICANT CHANGE UP (ref 7–23)
CALCIUM SERPL-MCNC: 9 MG/DL — SIGNIFICANT CHANGE UP (ref 8.4–10.5)
CHLORIDE SERPL-SCNC: 99 MMOL/L — SIGNIFICANT CHANGE UP (ref 96–108)
CO2 SERPL-SCNC: 25 MMOL/L — SIGNIFICANT CHANGE UP (ref 22–31)
CREAT SERPL-MCNC: 0.99 MG/DL — SIGNIFICANT CHANGE UP (ref 0.5–1.3)
GLUCOSE SERPL-MCNC: 104 MG/DL — HIGH (ref 70–99)
HCT VFR BLD CALC: 27.7 % — LOW (ref 39–50)
HGB BLD-MCNC: 9.8 G/DL — LOW (ref 13–17)
INR BLD: 1.24 RATIO — HIGH (ref 0.88–1.16)
MCHC RBC-ENTMCNC: 33.4 PG — SIGNIFICANT CHANGE UP (ref 27–34)
MCHC RBC-ENTMCNC: 35.3 GM/DL — SIGNIFICANT CHANGE UP (ref 32–36)
MCV RBC AUTO: 94.5 FL — SIGNIFICANT CHANGE UP (ref 80–100)
PLATELET # BLD AUTO: 168 K/UL — SIGNIFICANT CHANGE UP (ref 150–400)
POTASSIUM SERPL-MCNC: 4.1 MMOL/L — SIGNIFICANT CHANGE UP (ref 3.5–5.3)
POTASSIUM SERPL-SCNC: 4.1 MMOL/L — SIGNIFICANT CHANGE UP (ref 3.5–5.3)
PROTHROM AB SERPL-ACNC: 13.5 SEC — HIGH (ref 9.8–12.7)
RBC # BLD: 2.93 M/UL — LOW (ref 4.2–5.8)
RBC # FLD: 12 % — SIGNIFICANT CHANGE UP (ref 10.3–14.5)
SODIUM SERPL-SCNC: 138 MMOL/L — SIGNIFICANT CHANGE UP (ref 135–145)
SURGICAL PATHOLOGY STUDY: SIGNIFICANT CHANGE UP
WBC # BLD: 7.2 K/UL — SIGNIFICANT CHANGE UP (ref 3.8–10.5)
WBC # FLD AUTO: 7.2 K/UL — SIGNIFICANT CHANGE UP (ref 3.8–10.5)

## 2018-04-17 RX ORDER — LORATADINE 10 MG/1
10 TABLET ORAL DAILY
Qty: 0 | Refills: 0 | Status: DISCONTINUED | OUTPATIENT
Start: 2018-04-17 | End: 2018-04-22

## 2018-04-17 RX ORDER — ACETAMINOPHEN 500 MG
650 TABLET ORAL EVERY 6 HOURS
Qty: 0 | Refills: 0 | Status: DISCONTINUED | OUTPATIENT
Start: 2018-04-17 | End: 2018-04-22

## 2018-04-17 RX ORDER — WARFARIN SODIUM 2.5 MG/1
5 TABLET ORAL ONCE
Qty: 0 | Refills: 0 | Status: COMPLETED | OUTPATIENT
Start: 2018-04-17 | End: 2018-04-17

## 2018-04-17 RX ADMIN — OXYCODONE HYDROCHLORIDE 10 MILLIGRAM(S): 5 TABLET ORAL at 02:31

## 2018-04-17 RX ADMIN — Medication 0.5 MILLIGRAM(S): at 18:01

## 2018-04-17 RX ADMIN — OXYCODONE HYDROCHLORIDE 10 MILLIGRAM(S): 5 TABLET ORAL at 14:01

## 2018-04-17 RX ADMIN — LORATADINE 10 MILLIGRAM(S): 10 TABLET ORAL at 13:01

## 2018-04-17 RX ADMIN — Medication 3 MILLILITER(S): at 18:01

## 2018-04-17 RX ADMIN — Medication 3 MILLILITER(S): at 13:01

## 2018-04-17 RX ADMIN — Medication 600 MILLIGRAM(S): at 05:09

## 2018-04-17 RX ADMIN — OXYCODONE HYDROCHLORIDE 10 MILLIGRAM(S): 5 TABLET ORAL at 23:15

## 2018-04-17 RX ADMIN — WARFARIN SODIUM 5 MILLIGRAM(S): 2.5 TABLET ORAL at 22:59

## 2018-04-17 RX ADMIN — Medication 600 MILLIGRAM(S): at 13:01

## 2018-04-17 RX ADMIN — Medication 100 MILLIGRAM(S): at 13:02

## 2018-04-17 RX ADMIN — PANTOPRAZOLE SODIUM 40 MILLIGRAM(S): 20 TABLET, DELAYED RELEASE ORAL at 05:09

## 2018-04-17 RX ADMIN — ENOXAPARIN SODIUM 40 MILLIGRAM(S): 100 INJECTION SUBCUTANEOUS at 18:01

## 2018-04-17 RX ADMIN — Medication 0.1 MILLIGRAM(S): at 05:13

## 2018-04-17 RX ADMIN — ENOXAPARIN SODIUM 40 MILLIGRAM(S): 100 INJECTION SUBCUTANEOUS at 05:10

## 2018-04-17 RX ADMIN — OXYCODONE HYDROCHLORIDE 10 MILLIGRAM(S): 5 TABLET ORAL at 18:06

## 2018-04-17 RX ADMIN — Medication 0.1 MILLIGRAM(S): at 13:02

## 2018-04-17 RX ADMIN — Medication 325 MILLIGRAM(S): at 13:01

## 2018-04-17 RX ADMIN — OXYCODONE HYDROCHLORIDE 10 MILLIGRAM(S): 5 TABLET ORAL at 18:36

## 2018-04-17 RX ADMIN — Medication 3 MILLILITER(S): at 00:08

## 2018-04-17 RX ADMIN — Medication 0.1 MILLIGRAM(S): at 22:59

## 2018-04-17 RX ADMIN — Medication 0.5 MILLIGRAM(S): at 05:09

## 2018-04-17 RX ADMIN — OXYCODONE HYDROCHLORIDE 10 MILLIGRAM(S): 5 TABLET ORAL at 10:34

## 2018-04-17 RX ADMIN — Medication 600 MILLIGRAM(S): at 22:59

## 2018-04-17 RX ADMIN — OXYCODONE HYDROCHLORIDE 10 MILLIGRAM(S): 5 TABLET ORAL at 18:10

## 2018-04-17 RX ADMIN — OXYCODONE HYDROCHLORIDE 10 MILLIGRAM(S): 5 TABLET ORAL at 03:00

## 2018-04-17 RX ADMIN — Medication 3 MILLILITER(S): at 05:13

## 2018-04-17 RX ADMIN — OXYCODONE HYDROCHLORIDE 10 MILLIGRAM(S): 5 TABLET ORAL at 05:40

## 2018-04-17 RX ADMIN — Medication 100 MILLIGRAM(S): at 22:59

## 2018-04-17 RX ADMIN — OXYCODONE HYDROCHLORIDE 10 MILLIGRAM(S): 5 TABLET ORAL at 11:04

## 2018-04-17 RX ADMIN — Medication 100 MILLIGRAM(S): at 05:09

## 2018-04-17 RX ADMIN — OXYCODONE HYDROCHLORIDE 10 MILLIGRAM(S): 5 TABLET ORAL at 14:31

## 2018-04-17 NOTE — PROGRESS NOTE ADULT - ASSESSMENT
As per [facility] EMR: 36yo male with pmh of HTN not on any meds anymore, current every day smoker presents with chest pain for 2 hours. Pt states 2 hours ago he was working out then took a hot shower and started to have severe chest pain, radiating down the abdomen with nausea and diaphoresis. Pt states worse with exertion. Pt denies v/d, urinary symptoms, loc, recent travel and sickness. Denies cocaine use endorses marijuana use. Pt denies family ho of cardiac disease.  On CTA, found to have: Ascending aortic aneurysm measuring 6 cm. 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 There is   extension into the brachiocephalic artery with nonopacification of the right common carotid artery concerning for acute occlusion. There is also extension into the proximal portion of the left subclavian artery. Dissection extends down into the descending aorta and ends at the level of the renal arteries. There is extension of the dissection into the celiac and superior mesenteric artery. 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 The inferior mesenteric artery is patent.  Transferred to [facility] for emergent surgical repair.   04/11/2018  Bio Bentall  AVR #27  w/ 34 hemoshield graft   + inotropes  extubated d 1  Vascular consult for L  hand numbness: Patient is seen He  is post op  Type  A dissection repair , complaining of left  hand finger numbness. CTA and US  reviewed   dissection flap in the right CCA extending into the ICA ,Thrombus  seen  . Recommend  anticoagulation with  Heparin  if possible    Post op hypoxemia, required bipap and high flow oxygen, on 100% titrated down.    hypertension,    Anticoagulation started for carotid thrombus  4/17 pt for transfer to floor.  BP well controlled

## 2018-04-17 NOTE — PROGRESS NOTE ADULT - SUBJECTIVE AND OBJECTIVE BOX
im a little tired    VITAL SIGNS    Telemetry:  nsr 60    Vital Signs Last 24 Hrs  T(C): 36.7 (18 @ 06:57), Max: 36.8 (18 @ 19:32)  T(F): 98 (18 @ 06:57), Max: 98.3 (18 @ 19:32)  HR: 69 (18 @ 06:57) (69 - 77)  BP: 114/66 (18 @ 06:57) (101/59 - 163/73)  RR: 18 (18 @ 06:57) (14 - 23)  SpO2: 92% (18 @ 06:57) (91% - 100%)                    @ 07:01  -   @ 07:00  --------------------------------------------------------  IN: 780 mL / OUT: 3375 mL / NET: -2595 mL     @ 07:01  -   @ 10:20  --------------------------------------------------------  IN: 360 mL / OUT: 0 mL / NET: 360 mL          Daily     Daily Weight in k.1 (2018 06:00)        CAPILLARY BLOOD GLUCOSE  115 (2018 12:00)      POCT Blood Glucose.: 118 mg/dL (2018 21:56)  POCT Blood Glucose.: 109 mg/dL (2018 17:05)                Coumadin    [ x] YES          [  ]      NO     carotid thrombus                              PHYSICAL EXAM        Neurology: alert and oriented x 3, nonfocal, no gross deficits  CV : .S1S2 RRR  Sternal Wound :  CDI , Stable  Pacing Wires        [ x ]   Settings:                                  Isolated  [  ]  Lungs: bibasilar crackles   Abdomen: soft, nontender, nondistended, positive bowel sounds, last bowel movement   :         voiding    Extremities:     - edema - calve tenderness          acetaminophen   Tablet. 650 milliGRAM(s) Oral every 6 hours PRN  ALBUTerol/ipratropium for Nebulization 3 milliLiter(s) Nebulizer every 6 hours  aspirin enteric coated 325 milliGRAM(s) Oral daily  buDESOnide   0.5 milliGRAM(s) Respule 0.5 milliGRAM(s) Inhalation every 12 hours  cloNIDine 0.1 milliGRAM(s) Oral every 8 hours  docusate sodium 100 milliGRAM(s) Oral three times a day  enoxaparin Injectable 40 milliGRAM(s) SubCutaneous every 12 hours  fluticasone propionate 50 MICROgram(s)/spray Nasal Spray 1 Spray(s) Both Nostrils two times a day  labetalol 600 milliGRAM(s) Oral every 8 hours  oxyCODONE    IR 5 milliGRAM(s) Oral every 3 hours PRN  oxyCODONE    IR 10 milliGRAM(s) Oral every 3 hours PRN  pantoprazole    Tablet 40 milliGRAM(s) Oral before breakfast                    Physical Therapy Rec:   Home  [xx  ]   Home w/ PT  [  ]  Rehab  [  ]  Discussed with Cardiothoracic Team at AM rounds.

## 2018-04-18 LAB
ALBUMIN SERPL ELPH-MCNC: 3.2 G/DL — LOW (ref 3.3–5)
ALP SERPL-CCNC: 41 U/L — SIGNIFICANT CHANGE UP (ref 40–120)
ALT FLD-CCNC: 41 U/L — SIGNIFICANT CHANGE UP (ref 10–45)
ANION GAP SERPL CALC-SCNC: 12 MMOL/L — SIGNIFICANT CHANGE UP (ref 5–17)
ANION GAP SERPL CALC-SCNC: 14 MMOL/L — SIGNIFICANT CHANGE UP (ref 5–17)
APTT BLD: 33.2 SEC — SIGNIFICANT CHANGE UP (ref 27.5–37.4)
AST SERPL-CCNC: 32 U/L — SIGNIFICANT CHANGE UP (ref 10–40)
BILIRUB SERPL-MCNC: 0.4 MG/DL — SIGNIFICANT CHANGE UP (ref 0.2–1.2)
BUN SERPL-MCNC: 13 MG/DL — SIGNIFICANT CHANGE UP (ref 7–23)
BUN SERPL-MCNC: 14 MG/DL — SIGNIFICANT CHANGE UP (ref 7–23)
CALCIUM SERPL-MCNC: 9.2 MG/DL — SIGNIFICANT CHANGE UP (ref 8.4–10.5)
CALCIUM SERPL-MCNC: 9.3 MG/DL — SIGNIFICANT CHANGE UP (ref 8.4–10.5)
CHLORIDE SERPL-SCNC: 100 MMOL/L — SIGNIFICANT CHANGE UP (ref 96–108)
CHLORIDE SERPL-SCNC: 101 MMOL/L — SIGNIFICANT CHANGE UP (ref 96–108)
CO2 SERPL-SCNC: 26 MMOL/L — SIGNIFICANT CHANGE UP (ref 22–31)
CO2 SERPL-SCNC: 28 MMOL/L — SIGNIFICANT CHANGE UP (ref 22–31)
CREAT SERPL-MCNC: 1.01 MG/DL — SIGNIFICANT CHANGE UP (ref 0.5–1.3)
CREAT SERPL-MCNC: 1.04 MG/DL — SIGNIFICANT CHANGE UP (ref 0.5–1.3)
GLUCOSE SERPL-MCNC: 93 MG/DL — SIGNIFICANT CHANGE UP (ref 70–99)
GLUCOSE SERPL-MCNC: 95 MG/DL — SIGNIFICANT CHANGE UP (ref 70–99)
HCT VFR BLD CALC: 30.2 % — LOW (ref 39–50)
HGB BLD-MCNC: 10.4 G/DL — LOW (ref 13–17)
INR BLD: 1.25 RATIO — HIGH (ref 0.88–1.16)
MCHC RBC-ENTMCNC: 32.3 PG — SIGNIFICANT CHANGE UP (ref 27–34)
MCHC RBC-ENTMCNC: 34.3 GM/DL — SIGNIFICANT CHANGE UP (ref 32–36)
MCV RBC AUTO: 94 FL — SIGNIFICANT CHANGE UP (ref 80–100)
PHOSPHATE SERPL-MCNC: 5.1 MG/DL — HIGH (ref 2.5–4.5)
PLATELET # BLD AUTO: 201 K/UL — SIGNIFICANT CHANGE UP (ref 150–400)
POTASSIUM SERPL-MCNC: 3.9 MMOL/L — SIGNIFICANT CHANGE UP (ref 3.5–5.3)
POTASSIUM SERPL-MCNC: 4 MMOL/L — SIGNIFICANT CHANGE UP (ref 3.5–5.3)
POTASSIUM SERPL-SCNC: 3.9 MMOL/L — SIGNIFICANT CHANGE UP (ref 3.5–5.3)
POTASSIUM SERPL-SCNC: 4 MMOL/L — SIGNIFICANT CHANGE UP (ref 3.5–5.3)
PROT SERPL-MCNC: 5.9 G/DL — LOW (ref 6–8.3)
PROTHROM AB SERPL-ACNC: 13.6 SEC — HIGH (ref 9.8–12.7)
RBC # BLD: 3.21 M/UL — LOW (ref 4.2–5.8)
RBC # FLD: 11.9 % — SIGNIFICANT CHANGE UP (ref 10.3–14.5)
SODIUM SERPL-SCNC: 140 MMOL/L — SIGNIFICANT CHANGE UP (ref 135–145)
SODIUM SERPL-SCNC: 141 MMOL/L — SIGNIFICANT CHANGE UP (ref 135–145)
WBC # BLD: 8.2 K/UL — SIGNIFICANT CHANGE UP (ref 3.8–10.5)
WBC # FLD AUTO: 8.2 K/UL — SIGNIFICANT CHANGE UP (ref 3.8–10.5)

## 2018-04-18 RX ORDER — WARFARIN SODIUM 2.5 MG/1
7 TABLET ORAL ONCE
Qty: 0 | Refills: 0 | Status: DISCONTINUED | OUTPATIENT
Start: 2018-04-18 | End: 2018-04-18

## 2018-04-18 RX ORDER — WARFARIN SODIUM 2.5 MG/1
5 TABLET ORAL ONCE
Qty: 0 | Refills: 0 | Status: COMPLETED | OUTPATIENT
Start: 2018-04-18 | End: 2018-04-18

## 2018-04-18 RX ORDER — ALPRAZOLAM 0.25 MG
0.25 TABLET ORAL EVERY 12 HOURS
Qty: 0 | Refills: 0 | Status: DISCONTINUED | OUTPATIENT
Start: 2018-04-18 | End: 2018-04-22

## 2018-04-18 RX ADMIN — Medication 0.1 MILLIGRAM(S): at 06:36

## 2018-04-18 RX ADMIN — OXYCODONE HYDROCHLORIDE 10 MILLIGRAM(S): 5 TABLET ORAL at 21:40

## 2018-04-18 RX ADMIN — PANTOPRAZOLE SODIUM 40 MILLIGRAM(S): 20 TABLET, DELAYED RELEASE ORAL at 06:36

## 2018-04-18 RX ADMIN — OXYCODONE HYDROCHLORIDE 10 MILLIGRAM(S): 5 TABLET ORAL at 14:05

## 2018-04-18 RX ADMIN — Medication 3 MILLILITER(S): at 00:00

## 2018-04-18 RX ADMIN — Medication 600 MILLIGRAM(S): at 06:36

## 2018-04-18 RX ADMIN — Medication 0.1 MILLIGRAM(S): at 13:31

## 2018-04-18 RX ADMIN — OXYCODONE HYDROCHLORIDE 10 MILLIGRAM(S): 5 TABLET ORAL at 06:52

## 2018-04-18 RX ADMIN — ENOXAPARIN SODIUM 40 MILLIGRAM(S): 100 INJECTION SUBCUTANEOUS at 17:13

## 2018-04-18 RX ADMIN — OXYCODONE HYDROCHLORIDE 10 MILLIGRAM(S): 5 TABLET ORAL at 07:35

## 2018-04-18 RX ADMIN — OXYCODONE HYDROCHLORIDE 10 MILLIGRAM(S): 5 TABLET ORAL at 00:00

## 2018-04-18 RX ADMIN — OXYCODONE HYDROCHLORIDE 10 MILLIGRAM(S): 5 TABLET ORAL at 18:15

## 2018-04-18 RX ADMIN — Medication 600 MILLIGRAM(S): at 13:31

## 2018-04-18 RX ADMIN — Medication 0.5 MILLIGRAM(S): at 06:54

## 2018-04-18 RX ADMIN — OXYCODONE HYDROCHLORIDE 10 MILLIGRAM(S): 5 TABLET ORAL at 10:45

## 2018-04-18 RX ADMIN — Medication 325 MILLIGRAM(S): at 12:06

## 2018-04-18 RX ADMIN — ENOXAPARIN SODIUM 40 MILLIGRAM(S): 100 INJECTION SUBCUTANEOUS at 06:36

## 2018-04-18 RX ADMIN — Medication 600 MILLIGRAM(S): at 21:53

## 2018-04-18 RX ADMIN — Medication 0.25 MILLIGRAM(S): at 22:15

## 2018-04-18 RX ADMIN — Medication 0.1 MILLIGRAM(S): at 21:53

## 2018-04-18 RX ADMIN — Medication 100 MILLIGRAM(S): at 06:36

## 2018-04-18 RX ADMIN — Medication 3 MILLILITER(S): at 12:07

## 2018-04-18 RX ADMIN — LORATADINE 10 MILLIGRAM(S): 10 TABLET ORAL at 12:06

## 2018-04-18 RX ADMIN — OXYCODONE HYDROCHLORIDE 10 MILLIGRAM(S): 5 TABLET ORAL at 15:04

## 2018-04-18 RX ADMIN — OXYCODONE HYDROCHLORIDE 10 MILLIGRAM(S): 5 TABLET ORAL at 09:47

## 2018-04-18 RX ADMIN — Medication 1 SPRAY(S): at 06:36

## 2018-04-18 RX ADMIN — OXYCODONE HYDROCHLORIDE 10 MILLIGRAM(S): 5 TABLET ORAL at 22:20

## 2018-04-18 RX ADMIN — WARFARIN SODIUM 5 MILLIGRAM(S): 2.5 TABLET ORAL at 21:53

## 2018-04-18 RX ADMIN — Medication 3 MILLILITER(S): at 06:54

## 2018-04-18 RX ADMIN — OXYCODONE HYDROCHLORIDE 10 MILLIGRAM(S): 5 TABLET ORAL at 17:17

## 2018-04-18 NOTE — CONSULT NOTE ADULT - SUBJECTIVE AND OBJECTIVE BOX
CHIEF COMPLAINT: chest pain    HISTORY OF PRESENT ILLNESS:   36 y/o male with pmh of HTN, HLD on no current medications (not treated formally since 2016), every day smoker presents with chest pain for 2 hours. Pt states 2 hours PTA he was working out then took a hot shower and started to have severe chest pain, radiating down the abdomen with nausea and diaphoresis.  On CTA, found to have: Ascending aortic aneurysm measuring 6 cm.   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 There is extension into the brachiocephalic artery with non-opacification of the right common carotid artery concerning for acute occlusion. There is also extension into the proximal portion of the left subclavian artery. Dissection extends down into the descending aorta and ends at the level of the renal arteries. There is extension of the dissection into the celiac and superior mesenteric artery. 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 The inferior mesenteric artery is patent.  Transferred to [facility] for emergent surgical repair.   2018  Bio Bentall  AVR #27  w/ 34 hemoshield graft   + inotropes  extubated d 1  Vascular consult for L  hand numbness:   dissection flap in the right CCA extending into the ICA ,Thrombus  seen. Recommend  anticoagulation with  Heparin  if possible      hypertension,  Anticoagulation started for carotid thrombus   pt transferred to floor.  BP well controlled    dressing removed  pacing wires d/c INR 1.25 coumadin tonight     Plan dispo to home and pt is in need of a community cardiologist to follow BP and INR levels.    PAST MEDICAL & SURGICAL HISTORY:  HTN (hypertension)  HLD  Smoker, current          MEDICATIONS:  aspirin enteric coated 325 milliGRAM(s) Oral daily  cloNIDine 0.1 milliGRAM(s) Oral every 8 hours  enoxaparin Injectable 40 milliGRAM(s) SubCutaneous every 12 hours  labetalol 600 milliGRAM(s) Oral every 8 hours  warfarin 7 milliGRAM(s) Oral once      ALBUTerol/ipratropium for Nebulization 3 milliLiter(s) Nebulizer every 6 hours  buDESOnide   0.5 milliGRAM(s) Respule 0.5 milliGRAM(s) Inhalation every 12 hours  loratadine 10 milliGRAM(s) Oral daily    acetaminophen   Tablet. 650 milliGRAM(s) Oral every 6 hours PRN  oxyCODONE    IR 5 milliGRAM(s) Oral every 3 hours PRN  oxyCODONE    IR 10 milliGRAM(s) Oral every 3 hours PRN    docusate sodium 100 milliGRAM(s) Oral three times a day  pantoprazole    Tablet 40 milliGRAM(s) Oral before breakfast      fluticasone propionate 50 MICROgram(s)/spray Nasal Spray 1 Spray(s) Both Nostrils two times a day      FAMILY HISTORY:  No pertinent family history in first degree relatives      SOCIAL HISTORY:    [ ] Non-smoker  [ x ] Smoker  [ x ] Alcohol, Social    Allergies    No Known Allergies    Intolerances    	    REVIEW OF SYSTEMS:  CONSTITUTIONAL: No fever, weight loss, or fatigue  EYES: No eye pain, visual disturbances, or discharge  ENMT:  No difficulty hearing, tinnitus, vertigo; No sinus or throat pain  NECK: No pain or stiffness  RESPIRATORY: No cough, wheezing, chills or hemoptysis; No Shortness of Breath  CARDIOVASCULAR: See HPI  GASTROINTESTINAL: No abdominal or epigastric pain. No nausea, vomiting, or hematemesis; No diarrhea or constipation. No melena or hematochezia.  GENITOURINARY: No dysuria, frequency, hematuria, or incontinence  NEUROLOGICAL: No headaches, memory loss, loss of strength, numbness, or tremors  SKIN: No itching, burning, rashes, or lesions   LYMPH Nodes: No enlarged glands  ENDOCRINE: No heat or cold intolerance; No hair loss  MUSCULOSKELETAL: No joint pain or swelling; No muscle, back, or extremity pain  PSYCHIATRIC: No depression, anxiety, mood swings, or difficulty sleeping  HEME/LYMPH: No easy bruising, or bleeding gums  ALLERGY AND IMMUNOLOGIC: No hives or eczema	    [ ] All others negative	  [ ] Unable to obtain    PHYSICAL EXAM:  T(F): 98 (18 @ 13:33), Max: 98.2 (18 @ 21:03)  HR: 68 (18 @ 13:33) (68 - 78)  BP: 105/61 (18 @ 13:33) (105/61 - 153/79)  RR: 18 (18 @ 13:33) (18 - 18)  SpO2: 95% (18 @ 13:33) (90% - 95%)  I&O's Summary    2018 07:01  -  2018 07:00  --------------------------------------------------------  IN: 1140 mL / OUT: 5100 mL / NET: -3960 mL    2018 07:  -  2018 17:37  --------------------------------------------------------  IN: 660 mL / OUT: 1350 mL / NET: -690 mL        Appearance: Normal	  HEENT:   Normal oral mucosa, PERRL, EOMI	  Lymphatic: No lymphadenopathy  Cardiovascular: Normal S1 S2, No JVD, No murmurs, No edema  Chest Midline Sternotomy Scar, healing well.  Respiratory: Lungs clear to auscultation	  Psychiatry: A & O x 3, Mood & affect appropriate  Gastrointestinal:  Soft, Non-tender, + BS	  Skin: No rashes, No ecchymoses, No cyanosis	  Neurologic: Non-focal  Extremities: Normal range of motion, No clubbing, cyanosis or edema  Vascular: Peripheral pulses palpable 2+ bilaterally    TELEMETRY: NSR, 70-80s, occ PVC	    EC Lead ECG (18 @ 03:36) >  SINUS RHYTHM  ST ELEVATION, CONSIDER EARLY REPOLARIZATION  BORDERLINE ECG      RADIOLOGY:   Xray Chest 1 View- PORTABLE-Routine (18 @ 03:22) >    IMPRESSION:    Small left effusion.      OTHER: 	  	  LABS:	 	                            10.4   8.2   )-----------( 201      ( 2018 07:03 )             30.2     04-18    140  |  100  |  13  ----------------------------<  93  4.0   |  26  |  1.01    Ca    9.2      2018 07:03  Phos  5.1     -18    TPro  5.9<L>  /  Alb  3.2<L>  /  TBili  0.4  /  DBili  x   /  AST  32  /  ALT  41  /  AlkPhos  41

## 2018-04-18 NOTE — PROGRESS NOTE ADULT - SUBJECTIVE AND OBJECTIVE BOX
Subjective Hello OOB in chair no acute distress    VITAL SIGNS  Telemetry:  -NSR 70-80s  Vital Signs Last 24 Hrs  T(C): 36.6 (18 @ 04:30), Max: 36.8 (18 @ 21:03)  T(F): 97.9 (18 @ 04:30), Max: 98.2 (18 @ 21:03)  HR: 72 (18 @ 04:30) (72 - 78)  BP: 130/77 (18 @ 04:30) (119/72 - 153/79)  RR: 18 (18 @ 04:30) (18 - 18)  SpO2: 93% (18 @ 04:30) (90% - 93%)            @ 07:01  -   @ 07:00  --------------------------------------------------------  IN: 1140 mL / OUT: 5100 mL / NET: -3960 mL    Daily Weight in k.7 (2018 09:15)    Pacing Wires        [  ]   Settings:                                  Isolated  x[  ]    Coumadin    [x ] YES          [  ]      NO         Reason:     AVR                  PHYSICAL EXAM    Neurology: alert and oriented x 3, nonfocal, no gross deficits    CV :S1 S2 RRR    Sternal Wound :  TATIANNA no drainage sternum Stable  Lungs:B/L CTA  Abdomen: soft, nontender, nondistended, positive bowel sounds, last bowel movement   :     voids        Extremities:    b/l le warm well perfused negative calf tenderness +DP        Physical Therapy Rec:   Home  [  x      Discussed with Cardiothoracic Team at AM rounds.

## 2018-04-18 NOTE — CONSULT NOTE ADULT - ASSESSMENT
37yMale with PMHx of:  HTN  HLD  Aortic Aneurysm, Type A Dissection, s/p Bio Bentall AVR #27 on 4/11/18    Dissection through right CCA with noted thrombus causing left hand numbness.  A/C with coumadin, requiring INR levels to be checked and ensure med compliance as outpt.    Pt is currently being bridged to coumadin with Lovenox injections.  Dose coumadin 5mg again tonight and D/C pt when INR is at minimum 1.7    Otherwise, he is stable from CV perspective with Dispo plans to home upon D/C    He will f/u in my office within 1 week of D/C.  Smoking Cessation discussion initiated and will continue upon f/u in the office.

## 2018-04-18 NOTE — PROGRESS NOTE ADULT - ASSESSMENT
As per [facility] EMR: 38yo male with pmh of HTN no current medications,  every day smoker presents with chest pain for 2 hours. Pt states 2 hours ago he was working out then took a hot shower and started to have severe chest pain, radiating down the abdomen with nausea and diaphoresis.  On CTA, found to have: Ascending aortic aneurysm measuring 6 cm. 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 There is extension into the brachiocephalic artery with nonopacification of the right common carotid artery concerning for acute occlusion. There is also extension into the proximal portion of the left subclavian artery. Dissection extends down into the descending aorta and ends at the level of the renal arteries. There is extension of the dissection into the celiac and superior mesenteric artery. 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 The inferior mesenteric artery is patent.  Transferred to [facility] for emergent surgical repair.   04/11/2018  Bio Bentall  AVR #27  w/ 34 hemoshield graft   + inotropes  extubated d 1  Vascular consult for L  hand numbness: Patient is seen He  is post op  Type  A dissection repair , complaining of left  hand finger numbness. CTA and US  reviewed   dissection flap in the right CCA extending into the ICA ,Thrombus  seen  . Recommend  anticoagulation with  Heparin  if possible    Post op hypoxemia, required bipap and high flow oxygen, on 100% titrated down.    hypertension,  Anticoagulation started for carotid thrombus  4/17 pt for transfer to floor.  BP well controlled   4/19 dressing removed  pacing wires d/c INR 1.25 coumadin tonight shower today  disposition to home in am

## 2018-04-18 NOTE — CHART NOTE - NSCHARTNOTEFT_GEN_A_CORE
Patient left floor  found smoking outside returned to room  educated on smoking cessation nicotine patch offered and refused

## 2018-04-19 LAB
ANION GAP SERPL CALC-SCNC: 13 MMOL/L — SIGNIFICANT CHANGE UP (ref 5–17)
BUN SERPL-MCNC: 15 MG/DL — SIGNIFICANT CHANGE UP (ref 7–23)
CALCIUM SERPL-MCNC: 8.8 MG/DL — SIGNIFICANT CHANGE UP (ref 8.4–10.5)
CHLORIDE SERPL-SCNC: 102 MMOL/L — SIGNIFICANT CHANGE UP (ref 96–108)
CO2 SERPL-SCNC: 23 MMOL/L — SIGNIFICANT CHANGE UP (ref 22–31)
CREAT SERPL-MCNC: 1 MG/DL — SIGNIFICANT CHANGE UP (ref 0.5–1.3)
GLUCOSE SERPL-MCNC: 136 MG/DL — HIGH (ref 70–99)
HCT VFR BLD CALC: 28.2 % — LOW (ref 39–50)
HGB BLD-MCNC: 10 G/DL — LOW (ref 13–17)
INR BLD: 1.24 RATIO — HIGH (ref 0.88–1.16)
MCHC RBC-ENTMCNC: 33.5 PG — SIGNIFICANT CHANGE UP (ref 27–34)
MCHC RBC-ENTMCNC: 35.4 GM/DL — SIGNIFICANT CHANGE UP (ref 32–36)
MCV RBC AUTO: 94.7 FL — SIGNIFICANT CHANGE UP (ref 80–100)
PLATELET # BLD AUTO: 200 K/UL — SIGNIFICANT CHANGE UP (ref 150–400)
POTASSIUM SERPL-MCNC: 3.9 MMOL/L — SIGNIFICANT CHANGE UP (ref 3.5–5.3)
POTASSIUM SERPL-SCNC: 3.9 MMOL/L — SIGNIFICANT CHANGE UP (ref 3.5–5.3)
PROTHROM AB SERPL-ACNC: 13.6 SEC — HIGH (ref 9.8–12.7)
RBC # BLD: 2.98 M/UL — LOW (ref 4.2–5.8)
RBC # FLD: 12 % — SIGNIFICANT CHANGE UP (ref 10.3–14.5)
SODIUM SERPL-SCNC: 138 MMOL/L — SIGNIFICANT CHANGE UP (ref 135–145)
WBC # BLD: 9 K/UL — SIGNIFICANT CHANGE UP (ref 3.8–10.5)
WBC # FLD AUTO: 9 K/UL — SIGNIFICANT CHANGE UP (ref 3.8–10.5)

## 2018-04-19 PROCEDURE — 71046 X-RAY EXAM CHEST 2 VIEWS: CPT | Mod: 26

## 2018-04-19 PROCEDURE — 71045 X-RAY EXAM CHEST 1 VIEW: CPT | Mod: 26,77

## 2018-04-19 PROCEDURE — 93306 TTE W/DOPPLER COMPLETE: CPT | Mod: 26

## 2018-04-19 PROCEDURE — 71045 X-RAY EXAM CHEST 1 VIEW: CPT

## 2018-04-19 RX ORDER — WARFARIN SODIUM 2.5 MG/1
5 TABLET ORAL ONCE
Qty: 0 | Refills: 0 | Status: DISCONTINUED | OUTPATIENT
Start: 2018-04-19 | End: 2018-04-19

## 2018-04-19 RX ORDER — POTASSIUM CHLORIDE 20 MEQ
20 PACKET (EA) ORAL ONCE
Qty: 0 | Refills: 0 | Status: COMPLETED | OUTPATIENT
Start: 2018-04-19 | End: 2018-04-19

## 2018-04-19 RX ORDER — WARFARIN SODIUM 2.5 MG/1
7 TABLET ORAL ONCE
Qty: 0 | Refills: 0 | Status: COMPLETED | OUTPATIENT
Start: 2018-04-19 | End: 2018-04-19

## 2018-04-19 RX ADMIN — Medication 3 MILLILITER(S): at 18:43

## 2018-04-19 RX ADMIN — WARFARIN SODIUM 7 MILLIGRAM(S): 2.5 TABLET ORAL at 22:10

## 2018-04-19 RX ADMIN — Medication 0.1 MILLIGRAM(S): at 05:23

## 2018-04-19 RX ADMIN — ENOXAPARIN SODIUM 40 MILLIGRAM(S): 100 INJECTION SUBCUTANEOUS at 18:43

## 2018-04-19 RX ADMIN — Medication 0.1 MILLIGRAM(S): at 22:10

## 2018-04-19 RX ADMIN — Medication 0.25 MILLIGRAM(S): at 19:51

## 2018-04-19 RX ADMIN — OXYCODONE HYDROCHLORIDE 10 MILLIGRAM(S): 5 TABLET ORAL at 07:00

## 2018-04-19 RX ADMIN — Medication 0.5 MILLIGRAM(S): at 05:23

## 2018-04-19 RX ADMIN — Medication 3 MILLILITER(S): at 14:28

## 2018-04-19 RX ADMIN — Medication 600 MILLIGRAM(S): at 14:28

## 2018-04-19 RX ADMIN — Medication 3 MILLILITER(S): at 05:23

## 2018-04-19 RX ADMIN — PANTOPRAZOLE SODIUM 40 MILLIGRAM(S): 20 TABLET, DELAYED RELEASE ORAL at 05:24

## 2018-04-19 RX ADMIN — Medication 3 MILLILITER(S): at 23:21

## 2018-04-19 RX ADMIN — Medication 600 MILLIGRAM(S): at 22:09

## 2018-04-19 RX ADMIN — OXYCODONE HYDROCHLORIDE 10 MILLIGRAM(S): 5 TABLET ORAL at 15:45

## 2018-04-19 RX ADMIN — Medication 325 MILLIGRAM(S): at 14:30

## 2018-04-19 RX ADMIN — OXYCODONE HYDROCHLORIDE 10 MILLIGRAM(S): 5 TABLET ORAL at 12:19

## 2018-04-19 RX ADMIN — OXYCODONE HYDROCHLORIDE 10 MILLIGRAM(S): 5 TABLET ORAL at 15:13

## 2018-04-19 RX ADMIN — Medication 0.5 MILLIGRAM(S): at 18:43

## 2018-04-19 RX ADMIN — OXYCODONE HYDROCHLORIDE 10 MILLIGRAM(S): 5 TABLET ORAL at 22:09

## 2018-04-19 RX ADMIN — Medication 0.1 MILLIGRAM(S): at 14:30

## 2018-04-19 RX ADMIN — ENOXAPARIN SODIUM 40 MILLIGRAM(S): 100 INJECTION SUBCUTANEOUS at 05:23

## 2018-04-19 RX ADMIN — LORATADINE 10 MILLIGRAM(S): 10 TABLET ORAL at 14:31

## 2018-04-19 RX ADMIN — Medication 3 MILLILITER(S): at 00:22

## 2018-04-19 RX ADMIN — OXYCODONE HYDROCHLORIDE 10 MILLIGRAM(S): 5 TABLET ORAL at 11:48

## 2018-04-19 RX ADMIN — OXYCODONE HYDROCHLORIDE 10 MILLIGRAM(S): 5 TABLET ORAL at 01:05

## 2018-04-19 RX ADMIN — OXYCODONE HYDROCHLORIDE 10 MILLIGRAM(S): 5 TABLET ORAL at 06:20

## 2018-04-19 RX ADMIN — OXYCODONE HYDROCHLORIDE 10 MILLIGRAM(S): 5 TABLET ORAL at 19:15

## 2018-04-19 RX ADMIN — OXYCODONE HYDROCHLORIDE 10 MILLIGRAM(S): 5 TABLET ORAL at 01:50

## 2018-04-19 RX ADMIN — OXYCODONE HYDROCHLORIDE 10 MILLIGRAM(S): 5 TABLET ORAL at 18:44

## 2018-04-19 RX ADMIN — Medication 20 MILLIEQUIVALENT(S): at 09:51

## 2018-04-19 RX ADMIN — Medication 600 MILLIGRAM(S): at 05:24

## 2018-04-19 RX ADMIN — OXYCODONE HYDROCHLORIDE 10 MILLIGRAM(S): 5 TABLET ORAL at 22:39

## 2018-04-19 NOTE — PROGRESS NOTE ADULT - SUBJECTIVE AND OBJECTIVE BOX
Subjective  Hello OOBin  chair no acute distress     VITAL SIGNS    Telemetry:  NSR 60-70  Vital Signs Last 24 Hrs  T(C): 36.7 (18 @ 05:00), Max: 37.1 (18 @ 20:38)  T(F): 98 (18 @ 05:00), Max: 98.8 (18 @ 20:38)  HR: 69 (18 @ 05:00) (68 - 74)  BP: 105/62 (18 @ 05:00) (105/61 - 130/69)  RR: 18 (18 @ 05:00) (18 - 183)  SpO2: 92% (18 @ 05:00) (92% - 95%)          @ 07:01  -   @ 07:00  --------------------------------------------------------  IN: 900 mL / OUT: 4250 mL / NET: -3350 mL     @ 07:01  -   @ 10:55  --------------------------------------------------------  IN: 360 mL / OUT: 450 mL / NET: -90 mL    Daily Weight in k.6 (2018     Coumadin    [x ] YES          [  ]      NO         Reason:    avr   PHYSICAL EXAM  Neurology: alert and oriented x 3, nonfocal, no gross deficits  CV : S1 S2 RRR  Sternal Wound :  TATIANNA sternum stable no erythema or drainage  Lungs: B/L breath sounds CTA  Abdomen: soft, nontender, nondistended, positive bowel sounds, last bowel movement   :    voids         Extremities:b/L LE warm _+ DP + 1 edema no calf tenderness        Physical Therapy Rec:   Home  [ x ]   Home w/ PT  [  ]  Rehab  [  ]    Discussed with Cardiothoracic Team at AM rounds.

## 2018-04-19 NOTE — PROGRESS NOTE ADULT - PROBLEM SELECTOR PLAN 1
BP control  ambulate  tid incentive spirometry  shower daily    Coumadin dosed by INR 1.24 today  disposition to home when INR therapeutic  Smoking cessation education

## 2018-04-19 NOTE — PROGRESS NOTE ADULT - SUBJECTIVE AND OBJECTIVE BOX
Subjective: Patient seen and examined. No new events except as noted.     No new complaints    MEDICATIONS:  MEDICATIONS  (STANDING):  ALBUTerol/ipratropium for Nebulization 3 milliLiter(s) Nebulizer every 6 hours  ALPRAZolam 0.25 milliGRAM(s) Oral every 12 hours  aspirin enteric coated 325 milliGRAM(s) Oral daily  buDESOnide   0.5 milliGRAM(s) Respule 0.5 milliGRAM(s) Inhalation every 12 hours  cloNIDine 0.1 milliGRAM(s) Oral every 8 hours  docusate sodium 100 milliGRAM(s) Oral three times a day  enoxaparin Injectable 40 milliGRAM(s) SubCutaneous every 12 hours  fluticasone propionate 50 MICROgram(s)/spray Nasal Spray 1 Spray(s) Both Nostrils two times a day  labetalol 600 milliGRAM(s) Oral every 8 hours  loratadine 10 milliGRAM(s) Oral daily  pantoprazole    Tablet 40 milliGRAM(s) Oral before breakfast  warfarin 5 milliGRAM(s) Oral once      PHYSICAL EXAM:  T(F): 98 (04-19-18 @ 05:00), Max: 98.8 (04-18-18 @ 20:38)  HR: 69 (04-19-18 @ 05:00) (68 - 74)  BP: 105/62 (04-19-18 @ 05:00) (105/61 - 130/69)  RR: 18 (04-19-18 @ 05:00) (18 - 183)  SpO2: 92% (04-19-18 @ 05:00) (92% - 95%)  I&O's Summary    18 Apr 2018 07:01  -  19 Apr 2018 07:00  --------------------------------------------------------  IN: 900 mL / OUT: 4250 mL / NET: -3350 mL    19 Apr 2018 07:01  -  19 Apr 2018 10:58  --------------------------------------------------------  IN: 360 mL / OUT: 450 mL / NET: -90 mL        Appearance: Normal	  HEENT:   Normal oral mucosa, PERRL, EOMI	  Lymphatic: No lymphadenopathy  Cardiovascular: Normal S1 S2, No JVD, No murmurs, No edema  Chest Midline Sternotomy Scar, healing well.  Respiratory: Lungs clear to auscultation	  Psychiatry: A & O x 3, Mood & affect appropriate  Gastrointestinal:  Soft, Non-tender, + BS	  Skin: No rashes, No ecchymoses, No cyanosis	  Neurologic: Non-focal  Extremities: Normal range of motion, No clubbing, cyanosis or edema  Vascular: Peripheral pulses palpable 2+ bilaterally    LABS:                            10.0   9.0   )-----------( 200      ( 19 Apr 2018 06:36 )             28.2     04-19    138  |  102  |  15  ----------------------------<  136<H>  3.9   |  23  |  1.00    Ca    8.8      19 Apr 2018 06:36  Phos  5.1     04-18    TPro  5.9<L>  /  Alb  3.2<L>  /  TBili  0.4  /  DBili  x   /  AST  32  /  ALT  41  /  AlkPhos  41  04-18      Echocardiogram: pending    OTHER: CXR pending

## 2018-04-19 NOTE — PROGRESS NOTE ADULT - ASSESSMENT
37yMale with PMHx of:  HTN  HLD  Aortic Aneurysm, Type A Dissection, s/p Bio Bentall AVR #27 on 4/11/18    Dissection through right CCA with noted thrombus.  Per Vasc surg attg, needs repeat Carotid evaluation (by CTA?) in 6 weeks  A/C with coumadin, requiring INR levels to be checked and ensure med compliance as outpt.    Pt is currently being bridged to coumadin with Lovenox injections.  Dose coumadin 7mg tonight and D/C pt when INR is at minimum 1.7    Otherwise, he is stable from CV perspective with Dispo plans to home upon D/C    He will f/u in my office within 1 week of D/C.  Smoking Cessation discussion initiated and will continue upon f/u in the office.

## 2018-04-19 NOTE — PROGRESS NOTE ADULT - ASSESSMENT
As per [facility] EMR: 36yo male with pmh of HTN no current medications,  every day smoker presents with chest pain for 2 hours. Pt states 2 hours ago he was working out then took a hot shower and started to have severe chest pain, radiating down the abdomen with nausea and diaphoresis.  On CTA, found to have: Ascending aortic aneurysm measuring 6 cm. 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 There is extension into the brachiocephalic artery with nonopacification of the right common carotid artery concerning for acute occlusion. There is also extension into the proximal portion of the left subclavian artery. Dissection extends down into the descending aorta and ends at the level of the renal arteries. There is extension of the dissection into the celiac and superior mesenteric artery. 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 The inferior mesenteric artery is patent.  Transferred to [facility] for emergent surgical repair.   04/11/2018  Bio Bentall  AVR #27  w/ 34 hemoshield graft   + inotropes  extubated d 1  Vascular consult for L  hand numbness: Patient is seen He  is post op  Type  A dissection repair , complaining of left  hand finger numbness. CTA and US  reviewed   dissection flap in the right CCA extending into the ICA ,Thrombus  seen  . Recommend  anticoagulation with  Heparin  if possible    Post op hypoxemia, required bipap and high flow oxygen, on 100% titrated down.    hypertension,  Anticoagulation started for carotid thrombus  4/17 pt for transfer to floor.  BP well controlled   4/18 dressing removed  pacing wires d/c INR 1.25 coumadin tonight shower today  disposition to home in am  4/19 INR 1.24 ECHO / CXR for today overnight patient left hospital to smoke -offered tobacco cessation materials/ nicotine patient refused.  coumadin 5 tonight disposition to home when INR theatric

## 2018-04-20 ENCOUNTER — TRANSCRIPTION ENCOUNTER (OUTPATIENT)
Age: 38
End: 2018-04-20

## 2018-04-20 LAB
ANION GAP SERPL CALC-SCNC: 12 MMOL/L — SIGNIFICANT CHANGE UP (ref 5–17)
BUN SERPL-MCNC: 14 MG/DL — SIGNIFICANT CHANGE UP (ref 7–23)
CALCIUM SERPL-MCNC: 9 MG/DL — SIGNIFICANT CHANGE UP (ref 8.4–10.5)
CHLORIDE SERPL-SCNC: 101 MMOL/L — SIGNIFICANT CHANGE UP (ref 96–108)
CO2 SERPL-SCNC: 25 MMOL/L — SIGNIFICANT CHANGE UP (ref 22–31)
CREAT SERPL-MCNC: 1.12 MG/DL — SIGNIFICANT CHANGE UP (ref 0.5–1.3)
GLUCOSE SERPL-MCNC: 103 MG/DL — HIGH (ref 70–99)
HCT VFR BLD CALC: 29.3 % — LOW (ref 39–50)
HGB BLD-MCNC: 10.1 G/DL — LOW (ref 13–17)
INR BLD: 1.38 RATIO — HIGH (ref 0.88–1.16)
MCHC RBC-ENTMCNC: 32.7 PG — SIGNIFICANT CHANGE UP (ref 27–34)
MCHC RBC-ENTMCNC: 34.6 GM/DL — SIGNIFICANT CHANGE UP (ref 32–36)
MCV RBC AUTO: 94.6 FL — SIGNIFICANT CHANGE UP (ref 80–100)
PHOSPHATE SERPL-MCNC: 4.9 MG/DL — HIGH (ref 2.5–4.5)
PLATELET # BLD AUTO: 225 K/UL — SIGNIFICANT CHANGE UP (ref 150–400)
POTASSIUM SERPL-MCNC: 4.3 MMOL/L — SIGNIFICANT CHANGE UP (ref 3.5–5.3)
POTASSIUM SERPL-SCNC: 4.3 MMOL/L — SIGNIFICANT CHANGE UP (ref 3.5–5.3)
PROTHROM AB SERPL-ACNC: 15 SEC — HIGH (ref 9.8–12.7)
RBC # BLD: 3.09 M/UL — LOW (ref 4.2–5.8)
RBC # FLD: 12 % — SIGNIFICANT CHANGE UP (ref 10.3–14.5)
SODIUM SERPL-SCNC: 138 MMOL/L — SIGNIFICANT CHANGE UP (ref 135–145)
WBC # BLD: 10.4 K/UL — SIGNIFICANT CHANGE UP (ref 3.8–10.5)
WBC # FLD AUTO: 10.4 K/UL — SIGNIFICANT CHANGE UP (ref 3.8–10.5)

## 2018-04-20 RX ORDER — WARFARIN SODIUM 2.5 MG/1
10 TABLET ORAL ONCE
Qty: 0 | Refills: 0 | Status: COMPLETED | OUTPATIENT
Start: 2018-04-20 | End: 2018-04-20

## 2018-04-20 RX ADMIN — Medication 325 MILLIGRAM(S): at 10:24

## 2018-04-20 RX ADMIN — LORATADINE 10 MILLIGRAM(S): 10 TABLET ORAL at 10:25

## 2018-04-20 RX ADMIN — ENOXAPARIN SODIUM 40 MILLIGRAM(S): 100 INJECTION SUBCUTANEOUS at 05:45

## 2018-04-20 RX ADMIN — Medication 0.25 MILLIGRAM(S): at 19:52

## 2018-04-20 RX ADMIN — Medication 0.1 MILLIGRAM(S): at 05:45

## 2018-04-20 RX ADMIN — OXYCODONE HYDROCHLORIDE 10 MILLIGRAM(S): 5 TABLET ORAL at 15:15

## 2018-04-20 RX ADMIN — Medication 600 MILLIGRAM(S): at 13:50

## 2018-04-20 RX ADMIN — OXYCODONE HYDROCHLORIDE 10 MILLIGRAM(S): 5 TABLET ORAL at 18:20

## 2018-04-20 RX ADMIN — Medication 600 MILLIGRAM(S): at 22:06

## 2018-04-20 RX ADMIN — OXYCODONE HYDROCHLORIDE 10 MILLIGRAM(S): 5 TABLET ORAL at 17:44

## 2018-04-20 RX ADMIN — OXYCODONE HYDROCHLORIDE 10 MILLIGRAM(S): 5 TABLET ORAL at 10:55

## 2018-04-20 RX ADMIN — Medication 600 MILLIGRAM(S): at 05:45

## 2018-04-20 RX ADMIN — PANTOPRAZOLE SODIUM 40 MILLIGRAM(S): 20 TABLET, DELAYED RELEASE ORAL at 05:45

## 2018-04-20 RX ADMIN — Medication 0.1 MILLIGRAM(S): at 22:06

## 2018-04-20 RX ADMIN — Medication 0.5 MILLIGRAM(S): at 17:22

## 2018-04-20 RX ADMIN — Medication 0.5 MILLIGRAM(S): at 05:45

## 2018-04-20 RX ADMIN — Medication 3 MILLILITER(S): at 17:22

## 2018-04-20 RX ADMIN — OXYCODONE HYDROCHLORIDE 10 MILLIGRAM(S): 5 TABLET ORAL at 10:24

## 2018-04-20 RX ADMIN — Medication 3 MILLILITER(S): at 12:21

## 2018-04-20 RX ADMIN — OXYCODONE HYDROCHLORIDE 10 MILLIGRAM(S): 5 TABLET ORAL at 14:44

## 2018-04-20 RX ADMIN — OXYCODONE HYDROCHLORIDE 10 MILLIGRAM(S): 5 TABLET ORAL at 21:37

## 2018-04-20 RX ADMIN — Medication 0.1 MILLIGRAM(S): at 13:50

## 2018-04-20 RX ADMIN — Medication 3 MILLILITER(S): at 05:45

## 2018-04-20 RX ADMIN — Medication 100 MILLIGRAM(S): at 13:50

## 2018-04-20 RX ADMIN — OXYCODONE HYDROCHLORIDE 10 MILLIGRAM(S): 5 TABLET ORAL at 06:31

## 2018-04-20 RX ADMIN — OXYCODONE HYDROCHLORIDE 10 MILLIGRAM(S): 5 TABLET ORAL at 05:51

## 2018-04-20 RX ADMIN — WARFARIN SODIUM 10 MILLIGRAM(S): 2.5 TABLET ORAL at 22:05

## 2018-04-20 RX ADMIN — ENOXAPARIN SODIUM 40 MILLIGRAM(S): 100 INJECTION SUBCUTANEOUS at 17:22

## 2018-04-20 RX ADMIN — OXYCODONE HYDROCHLORIDE 10 MILLIGRAM(S): 5 TABLET ORAL at 21:07

## 2018-04-20 NOTE — PROGRESS NOTE ADULT - SUBJECTIVE AND OBJECTIVE BOX
VITAL SIGNS    Telemetry:  rsr 60-80  Vital Signs Last 24 Hrs  T(C): 37.1 (18 @ 12:19), Max: 37.1 (18 @ 05:00)  T(F): 98.8 (18 @ 12:19), Max: 98.8 (18 @ 12:19)  HR: 70 (18 @ 14:04) (70 - 78)  BP: 116/72 (18 @ 14:04) (116/72 - 134/82)  RR: 18 (18 @ 12:19) (18 - 18)  SpO2: 91% (18 @ 12:19) (91% - 93%)             @ 07:01  -   @ 07:00  --------------------------------------------------------  IN: 1320 mL / OUT: 4000 mL / NET: -2680 mL     @ 07:01  -   @ 15:46  --------------------------------------------------------  IN: 720 mL / OUT: 925 mL / NET: -205 mL       Daily     Daily Weight in k.9 (2018 10:08)  Admit Wt: Drug Dosing Weight  Height (cm): 177.8 (2018 03:50)  Weight (kg): 95 (2018 03:50)  BMI (kg/m2): 30.1 (2018 03:50)  BSA (m2): 2.13 (2018 03:50)      CAPILLARY BLOOD GLUCOSE              acetaminophen   Tablet. 650 milliGRAM(s) Oral every 6 hours PRN  ALBUTerol/ipratropium for Nebulization 3 milliLiter(s) Nebulizer every 6 hours  ALPRAZolam 0.25 milliGRAM(s) Oral every 12 hours  aspirin enteric coated 325 milliGRAM(s) Oral daily  buDESOnide   0.5 milliGRAM(s) Respule 0.5 milliGRAM(s) Inhalation every 12 hours  cloNIDine 0.1 milliGRAM(s) Oral every 8 hours  docusate sodium 100 milliGRAM(s) Oral three times a day  enoxaparin Injectable 40 milliGRAM(s) SubCutaneous every 12 hours  fluticasone propionate 50 MICROgram(s)/spray Nasal Spray 1 Spray(s) Both Nostrils two times a day  labetalol 600 milliGRAM(s) Oral every 8 hours  loratadine 10 milliGRAM(s) Oral daily  oxyCODONE    IR 5 milliGRAM(s) Oral every 3 hours PRN  oxyCODONE    IR 10 milliGRAM(s) Oral every 3 hours PRN  pantoprazole    Tablet 40 milliGRAM(s) Oral before breakfast  warfarin 10 milliGRAM(s) Oral once      PHYSICAL EXAM    Subjective: "Maybe I can go home tomorrow?"   Neurology: alert and oriented x 3, nonfocal, no gross deficits  CV : tele: RSR 60-80   Sternal Wound :  CDI with dressing , Stable  Lungs: clear. RR easy, unlabored   Abdomen: soft, nontender, nondistended, positive bowel sounds, bowel movement   Neg N/V/D   :  pt voiding without difficulty   Extremities:   SAEED; edema, neg calf tenderness.   PPP bilaterally      PW: none   Chest tubes: none VITAL SIGNS    Telemetry:  rsr 60-80  Vital Signs Last 24 Hrs  T(C): 37.1 (18 @ 12:19), Max: 37.1 (18 @ 05:00)  T(F): 98.8 (18 @ 12:19), Max: 98.8 (18 @ 12:19)  HR: 70 (18 @ 14:04) (70 - 78)  BP: 116/72 (18 @ 14:04) (116/72 - 134/82)  RR: 18 (18 @ 12:19) (18 - 18)  SpO2: 91% (18 @ 12:19) (91% - 93%)             @ 07:01  -   @ 07:00  --------------------------------------------------------  IN: 1320 mL / OUT: 4000 mL / NET: -2680 mL     @ 07:01  -   @ 15:46  --------------------------------------------------------  IN: 720 mL / OUT: 925 mL / NET: -205 mL       Daily     Daily Weight in k.9 (2018 10:08)  Admit Wt: Drug Dosing Weight  Height (cm): 177.8 (2018 03:50)  Weight (kg): 95 (2018 03:50)  BMI (kg/m2): 30.1 (2018 03:50)  BSA (m2): 2.13 (2018 03:50)      acetaminophen   Tablet. 650 milliGRAM(s) Oral every 6 hours PRN  ALBUTerol/ipratropium for Nebulization 3 milliLiter(s) Nebulizer every 6 hours  ALPRAZolam 0.25 milliGRAM(s) Oral every 12 hours  aspirin enteric coated 325 milliGRAM(s) Oral daily  buDESOnide   0.5 milliGRAM(s) Respule 0.5 milliGRAM(s) Inhalation every 12 hours  cloNIDine 0.1 milliGRAM(s) Oral every 8 hours  docusate sodium 100 milliGRAM(s) Oral three times a day  enoxaparin Injectable 40 milliGRAM(s) SubCutaneous every 12 hours  fluticasone propionate 50 MICROgram(s)/spray Nasal Spray 1 Spray(s) Both Nostrils two times a day  labetalol 600 milliGRAM(s) Oral every 8 hours  loratadine 10 milliGRAM(s) Oral daily  oxyCODONE    IR 5 milliGRAM(s) Oral every 3 hours PRN  oxyCODONE    IR 10 milliGRAM(s) Oral every 3 hours PRN  pantoprazole    Tablet 40 milliGRAM(s) Oral before breakfast  warfarin 10 milliGRAM(s) Oral once      PHYSICAL EXAM    Subjective: "Maybe I can go home tomorrow?"   Neurology: alert and oriented x 3, nonfocal, no gross deficits  CV : tele: RSR 60-80   Sternal Wound :  CDI TATIANNA; sternum stable  Lungs: clear. RR easy, unlabored   Abdomen: soft, nontender, nondistended, positive bowel sounds, + bowel movement   Neg N/V/D   :  pt voiding without difficulty   Extremities:   SAEED; neg LE edema, neg calf tenderness.   PPP bilaterally      PW: none   Chest tubes: none

## 2018-04-20 NOTE — PROGRESS NOTE ADULT - SUBJECTIVE AND OBJECTIVE BOX
Subjective: Patient seen and examined. No new events except as noted.     No new complaints    MEDICATIONS:  MEDICATIONS  (STANDING):  ALBUTerol/ipratropium for Nebulization 3 milliLiter(s) Nebulizer every 6 hours  ALPRAZolam 0.25 milliGRAM(s) Oral every 12 hours  aspirin enteric coated 325 milliGRAM(s) Oral daily  buDESOnide   0.5 milliGRAM(s) Respule 0.5 milliGRAM(s) Inhalation every 12 hours  cloNIDine 0.1 milliGRAM(s) Oral every 8 hours  docusate sodium 100 milliGRAM(s) Oral three times a day  enoxaparin Injectable 40 milliGRAM(s) SubCutaneous every 12 hours  fluticasone propionate 50 MICROgram(s)/spray Nasal Spray 1 Spray(s) Both Nostrils two times a day  labetalol 600 milliGRAM(s) Oral every 8 hours  loratadine 10 milliGRAM(s) Oral daily  pantoprazole    Tablet 40 milliGRAM(s) Oral before breakfast      PHYSICAL EXAM:  T(F): 98.7 (04-20-18 @ 05:00), Max: 98.7 (04-20-18 @ 05:00)  HR: 71 (04-20-18 @ 05:00) (71 - 78)  BP: 134/82 (04-20-18 @ 05:00) (119/69 - 148/80)  RR: 18 (04-20-18 @ 05:00) (18 - 18)  SpO2: 93% (04-20-18 @ 05:00) (90% - 98%)  I&O's Summary    19 Apr 2018 07:01  -  20 Apr 2018 07:00  --------------------------------------------------------  IN: 1320 mL / OUT: 4000 mL / NET: -2680 mL    20 Apr 2018 07:01  -  20 Apr 2018 11:32  --------------------------------------------------------  IN: 360 mL / OUT: 600 mL / NET: -240 mL    Tele - SR 60-80 no ectopy    Appearance: Normal	  HEENT:   Normal oral mucosa, PERRL, EOMI	  Lymphatic: No lymphadenopathy  Cardiovascular: Normal S1 S2, No JVD, No murmurs, No edema  Chest Midline Sternotomy Scar, healing well.  Respiratory: Lungs clear to auscultation	  Psychiatry: A & O x 3, Mood & affect appropriate  Gastrointestinal:  Soft, Non-tender, + BS	  Skin: No rashes, No ecchymoses, No cyanosis	  Neurologic: Non-focal  Extremities: Normal range of motion, No clubbing, cyanosis or edema  Vascular: Peripheral pulses palpable 2+ bilaterally    LABS:                          10.1   10.4  )-----------( 225      ( 20 Apr 2018 06:36 )             29.3     04-20    138  |  101  |  14  ----------------------------<  103<H>  4.3   |  25  |  1.12    Ca    9.0      20 Apr 2018 06:30  Phos  4.9     04-20

## 2018-04-20 NOTE — DISCHARGE NOTE ADULT - HOSPITAL COURSE
As per [facility] EMR: 36yo male with pmh of HTN no current medications,  every day smoker presents with chest pain for 2 hours. Pt states 2 hours ago he was working out then took a hot shower and started to have severe chest pain, radiating down the abdomen with nausea and diaphoresis.  On CTA, found to have: Ascending aortic aneurysm measuring 6 cm. 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 There is extension into the brachiocephalic artery with nonopacification of the right common carotid artery concerning for acute occlusion. There is also extension into the proximal portion of the left subclavian artery. Dissection extends down into the descending aorta and ends at the level of the renal arteries. There is extension of the dissection into the celiac and superior mesenteric artery. 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 The inferior mesenteric artery is patent.  Transferred to [facility] for emergent surgical repair.   04/11/2018  Bio Bentall  AVR #27  w/ 34 hemoshield graft   + inotropes  extubated d 1  Vascular consult for L  hand numbness: Patient is seen He  is post op  Type  A dissection repair , complaining of left  hand finger numbness. CTA and US  reviewed   dissection flap in the right CCA extending into the ICA ,Thrombus  seen  . Recommend  anticoagulation with  Heparin  if possible    Post op hypoxemia, required bipap and high flow oxygen, on 100% titrated down.    hypertension,  Anticoagulation started for carotid thrombus  4/17 pt for transfer to floor.  BP well controlled   4/18 dressing removed  pacing wires d/c INR 1.25 coumadin tonight shower today  disposition to home in am  4/19 INR 1.24 ECHO / CXR for today overnight patient left hospital to smoke -offered tobacco cessation materials/ nicotine patient refused.  coumadin 5 tonight disposition to home when INR theatric  4/20 INR 1.38 - coumadin 10 mg this evening; Dr. Kareem Phillips to follow Coumadin levels as an outpatient- discharge planning home when INR 1.7 or greater   4/21 VSS; INR 1.45- coumadin 7.5 mg this evening; Discharge when INR >1.7

## 2018-04-20 NOTE — DISCHARGE NOTE ADULT - CARE PROVIDER_API CALL
Lalo Phillips), Cardiology  [address] [place] [address]  [address] [place] [address]  Phone: [phone]  Fax: [phone] Lalo Phillips), Cardiology  [address] [facility]  [address]  [place] [address]  Phone: [phone]  Fax: [phone]    Jarrod Mei), Surgery; Surgical Critical Care; Thoracic and Cardiac Surgery  [address] [place] [address]  Phone: [phone]  Fax: [phone]

## 2018-04-20 NOTE — DISCHARGE NOTE ADULT - ADDITIONAL INSTRUCTIONS
Dr Mei.  Appointment Thursday 4/26/18.  call office for appointment time tomorrow morning [phone]  Dr Phillips for INR check on Tuesday 4/24/18.

## 2018-04-20 NOTE — PROGRESS NOTE ADULT - ASSESSMENT
As per [facility] EMR: 36yo male with pmh of HTN no current medications,  every day smoker presents with chest pain for 2 hours. Pt states 2 hours ago he was working out then took a hot shower and started to have severe chest pain, radiating down the abdomen with nausea and diaphoresis.  On CTA, found to have: Ascending aortic aneurysm measuring 6 cm. 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 There is extension into the brachiocephalic artery with nonopacification of the right common carotid artery concerning for acute occlusion. There is also extension into the proximal portion of the left subclavian artery. Dissection extends down into the descending aorta and ends at the level of the renal arteries. There is extension of the dissection into the celiac and superior mesenteric artery. 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 The inferior mesenteric artery is patent.  Transferred to [facility] for emergent surgical repair.   04/11/2018  Bio Bentall  AVR #27  w/ 34 hemoshield graft   + inotropes  extubated d 1  Vascular consult for L  hand numbness: Patient is seen He  is post op  Type  A dissection repair , complaining of left  hand finger numbness. CTA and US  reviewed   dissection flap in the right CCA extending into the ICA ,Thrombus  seen  . Recommend  anticoagulation with  Heparin  if possible    Post op hypoxemia, required bipap and high flow oxygen, on 100% titrated down.    hypertension,  Anticoagulation started for carotid thrombus  4/17 pt for transfer to floor.  BP well controlled   4/18 dressing removed  pacing wires d/c INR 1.25 coumadin tonight shower today  disposition to home in am  4/19 INR 1.24 ECHO / CXR for today overnight patient left hospital to smoke -offered tobacco cessation materials/ nicotine patient refused.  coumadin 5 tonight disposition to home when INR theatric As per [facility] EMR: 38yo male with pmh of HTN no current medications,  every day smoker presents with chest pain for 2 hours. Pt states 2 hours ago he was working out then took a hot shower and started to have severe chest pain, radiating down the abdomen with nausea and diaphoresis.  On CTA, found to have: Ascending aortic aneurysm measuring 6 cm. 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 There is extension into the brachiocephalic artery with nonopacification of the right common carotid artery concerning for acute occlusion. There is also extension into the proximal portion of the left subclavian artery. Dissection extends down into the descending aorta and ends at the level of the renal arteries. There is extension of the dissection into the celiac and superior mesenteric artery. 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 The inferior mesenteric artery is patent.  Transferred to [facility] for emergent surgical repair.   04/11/2018  Bio Bentall  AVR #27  w/ 34 hemoshield graft   + inotropes  extubated d 1  Vascular consult for L  hand numbness: Patient is seen He  is post op  Type  A dissection repair , complaining of left  hand finger numbness. CTA and US  reviewed   dissection flap in the right CCA extending into the ICA ,Thrombus  seen  . Recommend  anticoagulation with  Heparin  if possible    Post op hypoxemia, required bipap and high flow oxygen, on 100% titrated down.    hypertension,  Anticoagulation started for carotid thrombus  4/17 pt for transfer to floor.  BP well controlled   4/18 dressing removed  pacing wires d/c INR 1.25 coumadin tonight shower today  disposition to home in am  4/19 INR 1.24 ECHO / CXR for today overnight patient left hospital to smoke -offered tobacco cessation materials/ nicotine patient refused.  coumadin 5 tonight disposition to home when INR theatric  4/20 INR 1.38 - coumadin 10 mg this evening; Dr. Kareem Phillips to follow Coumadin levels as an outpatient- discharge planning home when INR 1.7 or greater

## 2018-04-20 NOTE — DISCHARGE NOTE ADULT - PATIENT PORTAL LINK FT
You can access the [facility] Patient Portal, offered by [facility], by registering with the following website: [email or web address]

## 2018-04-20 NOTE — DISCHARGE NOTE ADULT - OTHER SIGNIFICANT FINDINGS
VITAL SIGNS    Telemetry:  60-80 NSR     Vital Signs Last 24 Hrs  T(C): 36.7 (04-22-18 @ 05:40), Max: 36.7 (04-21-18 @ 20:22)  T(F): 98.1 (04-22-18 @ 05:40), Max: 98.1 (04-21-18 @ 20:22)  HR: 74 (04-22-18 @ 05:40) (66 - 74)  BP: 138/77 (04-22-18 @ 05:40) (114/62 - 138/77)  RR: 17 (04-22-18 @ 05:40) (17 - 18)  SpO2: 94% (04-22-18 @ 05:40) (90% - 94%)                   04-21 @ 07:01  -  04-22 @ 07:00  --------------------------------------------------------  IN: 840 mL / OUT: 5500 mL / NET: -4660 mL        CAPILLARY BLOOD GLUCOSE    Drains:     MS         [  ] Drainage:                 L Pleural  [  ]  Drainage:                R Pleural  [  ]  Drainage:    Pacing Wires        [  ]   Settings:                                  Isolated  [  ]    Coumadin    [X ] YES          [  ]      NO         Reason:  Carotid thrombus                            PHYSICAL EXAM    Neurology: alert and oriented x 3, nonfocal, no gross deficits  CV : RRR No audible murmurs  Sternal Wound :  CDI , Stable  Lungs: CTA B/L  Abdomen: soft, nontender, nondistended, positive bowel sounds, last bowel movement   :     +normal male anatomy +urination         Extremities:     FROM X4 NO C/E/E      acetaminophen   Tablet. 650 milliGRAM(s) Oral every 6 hours PRN  ALBUTerol/ipratropium for Nebulization 3 milliLiter(s) Nebulizer every 6 hours  ALPRAZolam 0.25 milliGRAM(s) Oral every 12 hours  aspirin enteric coated 325 milliGRAM(s) Oral daily  buDESOnide   0.5 milliGRAM(s) Respule 0.5 milliGRAM(s) Inhalation every 12 hours  cloNIDine 0.1 milliGRAM(s) Oral every 8 hours  docusate sodium 100 milliGRAM(s) Oral three times a day  enoxaparin Injectable 40 milliGRAM(s) SubCutaneous every 12 hours  fluticasone propionate 50 MICROgram(s)/spray Nasal Spray 1 Spray(s) Both Nostrils two times a day  labetalol 600 milliGRAM(s) Oral every 8 hours  loratadine 10 milliGRAM(s) Oral daily  oxyCODONE    IR 5 milliGRAM(s) Oral every 3 hours PRN  oxyCODONE    IR 10 milliGRAM(s) Oral every 3 hours PRN  pantoprazole    Tablet 40 milliGRAM(s) Oral before breakfast          Physical Therapy Rec:   Home  [ X ]   Home w/ PT  [  ]  Rehab  [  ]  Discussed with Cardiothoracic Team at AM rounds.

## 2018-04-20 NOTE — PROGRESS NOTE ADULT - PROBLEM SELECTOR PLAN 1
BP control  ambulate  tid incentive spirometry  shower daily    Coumadin dosed by INR 1.24 today  disposition to home when INR therapeutic  Smoking cessation education continue postop care   BP control with labetolol and clonidine   ambulate  tid incentive spirometry  shower daily    Coumadin dosed by INR 1.37 today  Smoking cessation education  disposition to home when INR therapeutic sat/ sun

## 2018-04-20 NOTE — PROGRESS NOTE ADULT - ASSESSMENT
37yMale with PMHx of:  HTN  HLD  Aortic Aneurysm, Type A Dissection, s/p Bio Bentall AVR #27 on 4/11/18    Dissection through right CCA with noted thrombus.  Per Vasc surg attg, needs repeat Carotid evaluation (by CTA?) in 6 weeks  A/C with coumadin, requiring future INR levels to be checked and ensure med compliance as outpt.    Pt is currently being bridged to coumadin with Lovenox injections.  Dose coumadin 10mg tonight and D/C pt when INR is at minimum 1.7    Otherwise, he is stable from CV perspective with Dispo plans to home upon D/C    He will f/u in my office on Monday 4/23  Smoking Cessation discussion continued and will continue upon f/u in the office.

## 2018-04-20 NOTE — DISCHARGE NOTE ADULT - CARE PLAN
Principal Discharge DX:	S/P aortic dissection repair  Goal:	full recovery  Assessment and plan of treatment:	follow up with Dr Phillips for weekly INR Checks   refer to you cardiac surgery Do's and Donts Check list  call office for Appointment time on Thursday 4/26/18  Secondary Diagnosis:	Hypertension, unspecified type

## 2018-04-20 NOTE — DISCHARGE NOTE ADULT - NS AS ACTIVITY OBS
Stairs allowed/Sex allowed/Showering allowed/Walking-Indoors allowed/No Heavy lifting/straining/Do not make important decisions/Do not drive or operate machinery/Bathing allowed

## 2018-04-20 NOTE — DISCHARGE NOTE ADULT - MEDICATION SUMMARY - MEDICATIONS TO TAKE
I will START or STAY ON the medications listed below when I get home from the hospital:    budesonide 0.5 mg/2 mL inhalation suspension  -- 1 puff(s) inhaled once a day   -- Indication: For for Asthma    acetaminophen 325 mg oral tablet  -- 2 tab(s) by mouth every 6 hours, As needed, Mild Pain (1 - 3)  -- Indication: For for Pain     aspirin 325 mg oral delayed release tablet  -- 1 tab(s) by mouth once a day  -- Indication: For for blood thinner    cloNIDine 0.1 mg oral tablet  -- 1 tab(s) by mouth every 8 hours MDD:3  -- Indication: For for Blood Pressure    loratadine 10 mg oral tablet  -- 1 tab(s) by mouth once a day  -- Indication: For Antihistamine    labetalol 300 mg oral tablet  -- 2 tab(s) by mouth every 8 hours MDD:6  -- Indication: For b blocker    docusate sodium 100 mg oral capsule  -- 1 cap(s) by mouth 3 times a day MDD:3  -- Indication: For constipation     fluticasone 50 mcg/inh nasal spray  -- 1 spray(s) into nose 2 times a day  -- Indication: For Asthma/SOB    pantoprazole 40 mg oral delayed release tablet  -- 1 tab(s) by mouth once a day (before a meal)  -- Indication: For GERD I will START or STAY ON the medications listed below when I get home from the hospital:    budesonide 0.5 mg/2 mL inhalation suspension  -- 1 puff(s) inhaled once a day   -- Indication: For for Asthma    acetaminophen 325 mg oral tablet  -- 2 tab(s) by mouth every 6 hours, As needed, Mild Pain (1 - 3)  -- Indication: For for Pain     aspirin 325 mg oral delayed release tablet  -- 1 tab(s) by mouth once a day  -- Indication: For for blood thinner    oxyCODONE 5 mg oral tablet  -- 1 tab(s) by mouth every 6 to 8 hours, As Needed -Moderate Pain (4 - 6) MDD:4  -- Indication: For pain    cloNIDine 0.1 mg oral tablet  -- 1 tab(s) by mouth every 8 hours MDD:3  -- Indication: For for Blood Pressure    warfarin 5 mg oral tablet  -- 1 tab(s) by mouth once a day (at bedtime)   -- Do not take this drug if you are pregnant.  It is very important that you take or use this exactly as directed.  Do not skip doses or discontinue unless directed by your doctor.  Obtain medical advice before taking any non-prescription drugs as some may affect the action of this medication.    -- Indication: For blood thinner     loratadine 10 mg oral tablet  -- 1 tab(s) by mouth once a day  -- Indication: For Antihistamine    ALPRAZolam 0.25 mg oral tablet  -- 0.25 tab(s) by mouth every 12 hours MDD:2  -- Indication: For Anxiety    labetalol 300 mg oral tablet  -- 2 tab(s) by mouth every 8 hours MDD:6  -- Indication: For b blocker    docusate sodium 100 mg oral capsule  -- 1 cap(s) by mouth 3 times a day MDD:3  -- Indication: For constipation     fluticasone 50 mcg/inh nasal spray  -- 1 spray(s) into nose 2 times a day  -- Indication: For Asthma/SOB    pantoprazole 40 mg oral delayed release tablet  -- 1 tab(s) by mouth once a day (before a meal)  -- Indication: For GERD

## 2018-04-20 NOTE — DISCHARGE NOTE ADULT - PLAN OF CARE
full recovery follow up with Dr Phillips for weekly INR Checks   refer to you cardiac surgery Do's and Samantha Check list  call office for Appointment time on Thursday 4/26/18

## 2018-04-21 LAB
ANION GAP SERPL CALC-SCNC: 15 MMOL/L — SIGNIFICANT CHANGE UP (ref 5–17)
BUN SERPL-MCNC: 14 MG/DL — SIGNIFICANT CHANGE UP (ref 7–23)
CALCIUM SERPL-MCNC: 9.3 MG/DL — SIGNIFICANT CHANGE UP (ref 8.4–10.5)
CHLORIDE SERPL-SCNC: 100 MMOL/L — SIGNIFICANT CHANGE UP (ref 96–108)
CO2 SERPL-SCNC: 25 MMOL/L — SIGNIFICANT CHANGE UP (ref 22–31)
CREAT SERPL-MCNC: 1.1 MG/DL — SIGNIFICANT CHANGE UP (ref 0.5–1.3)
GLUCOSE SERPL-MCNC: 97 MG/DL — SIGNIFICANT CHANGE UP (ref 70–99)
HCT VFR BLD CALC: 30.5 % — LOW (ref 39–50)
HGB BLD-MCNC: 10.7 G/DL — LOW (ref 13–17)
INR BLD: 1.45 RATIO — HIGH (ref 0.88–1.16)
MCHC RBC-ENTMCNC: 33.2 PG — SIGNIFICANT CHANGE UP (ref 27–34)
MCHC RBC-ENTMCNC: 35.1 GM/DL — SIGNIFICANT CHANGE UP (ref 32–36)
MCV RBC AUTO: 94.5 FL — SIGNIFICANT CHANGE UP (ref 80–100)
PLATELET # BLD AUTO: 238 K/UL — SIGNIFICANT CHANGE UP (ref 150–400)
POTASSIUM SERPL-MCNC: 4.3 MMOL/L — SIGNIFICANT CHANGE UP (ref 3.5–5.3)
POTASSIUM SERPL-SCNC: 4.3 MMOL/L — SIGNIFICANT CHANGE UP (ref 3.5–5.3)
PROTHROM AB SERPL-ACNC: 15.9 SEC — HIGH (ref 9.8–12.7)
RBC # BLD: 3.23 M/UL — LOW (ref 4.2–5.8)
RBC # FLD: 12 % — SIGNIFICANT CHANGE UP (ref 10.3–14.5)
SODIUM SERPL-SCNC: 140 MMOL/L — SIGNIFICANT CHANGE UP (ref 135–145)
WBC # BLD: 9.6 K/UL — SIGNIFICANT CHANGE UP (ref 3.8–10.5)
WBC # FLD AUTO: 9.6 K/UL — SIGNIFICANT CHANGE UP (ref 3.8–10.5)

## 2018-04-21 RX ORDER — WARFARIN SODIUM 2.5 MG/1
7.5 TABLET ORAL ONCE
Qty: 0 | Refills: 0 | Status: COMPLETED | OUTPATIENT
Start: 2018-04-21 | End: 2018-04-21

## 2018-04-21 RX ADMIN — Medication 3 MILLILITER(S): at 11:34

## 2018-04-21 RX ADMIN — Medication 100 MILLIGRAM(S): at 21:05

## 2018-04-21 RX ADMIN — Medication 600 MILLIGRAM(S): at 06:11

## 2018-04-21 RX ADMIN — OXYCODONE HYDROCHLORIDE 10 MILLIGRAM(S): 5 TABLET ORAL at 21:05

## 2018-04-21 RX ADMIN — PANTOPRAZOLE SODIUM 40 MILLIGRAM(S): 20 TABLET, DELAYED RELEASE ORAL at 06:11

## 2018-04-21 RX ADMIN — OXYCODONE HYDROCHLORIDE 10 MILLIGRAM(S): 5 TABLET ORAL at 21:35

## 2018-04-21 RX ADMIN — OXYCODONE HYDROCHLORIDE 10 MILLIGRAM(S): 5 TABLET ORAL at 06:55

## 2018-04-21 RX ADMIN — OXYCODONE HYDROCHLORIDE 10 MILLIGRAM(S): 5 TABLET ORAL at 00:45

## 2018-04-21 RX ADMIN — OXYCODONE HYDROCHLORIDE 10 MILLIGRAM(S): 5 TABLET ORAL at 11:00

## 2018-04-21 RX ADMIN — OXYCODONE HYDROCHLORIDE 10 MILLIGRAM(S): 5 TABLET ORAL at 06:25

## 2018-04-21 RX ADMIN — Medication 0.1 MILLIGRAM(S): at 06:11

## 2018-04-21 RX ADMIN — OXYCODONE HYDROCHLORIDE 10 MILLIGRAM(S): 5 TABLET ORAL at 01:15

## 2018-04-21 RX ADMIN — Medication 3 MILLILITER(S): at 17:36

## 2018-04-21 RX ADMIN — OXYCODONE HYDROCHLORIDE 10 MILLIGRAM(S): 5 TABLET ORAL at 14:14

## 2018-04-21 RX ADMIN — Medication 100 MILLIGRAM(S): at 06:11

## 2018-04-21 RX ADMIN — Medication 600 MILLIGRAM(S): at 21:05

## 2018-04-21 RX ADMIN — Medication 0.1 MILLIGRAM(S): at 21:05

## 2018-04-21 RX ADMIN — OXYCODONE HYDROCHLORIDE 10 MILLIGRAM(S): 5 TABLET ORAL at 11:30

## 2018-04-21 RX ADMIN — WARFARIN SODIUM 7.5 MILLIGRAM(S): 2.5 TABLET ORAL at 21:05

## 2018-04-21 RX ADMIN — OXYCODONE HYDROCHLORIDE 10 MILLIGRAM(S): 5 TABLET ORAL at 18:00

## 2018-04-21 RX ADMIN — LORATADINE 10 MILLIGRAM(S): 10 TABLET ORAL at 11:34

## 2018-04-21 RX ADMIN — ENOXAPARIN SODIUM 40 MILLIGRAM(S): 100 INJECTION SUBCUTANEOUS at 06:12

## 2018-04-21 RX ADMIN — Medication 325 MILLIGRAM(S): at 11:34

## 2018-04-21 RX ADMIN — Medication 0.5 MILLIGRAM(S): at 06:12

## 2018-04-21 RX ADMIN — OXYCODONE HYDROCHLORIDE 10 MILLIGRAM(S): 5 TABLET ORAL at 13:46

## 2018-04-21 RX ADMIN — Medication 0.1 MILLIGRAM(S): at 13:46

## 2018-04-21 RX ADMIN — Medication 3 MILLILITER(S): at 06:12

## 2018-04-21 RX ADMIN — Medication 0.5 MILLIGRAM(S): at 17:36

## 2018-04-21 RX ADMIN — Medication 100 MILLIGRAM(S): at 13:45

## 2018-04-21 RX ADMIN — ENOXAPARIN SODIUM 40 MILLIGRAM(S): 100 INJECTION SUBCUTANEOUS at 17:37

## 2018-04-21 RX ADMIN — Medication 600 MILLIGRAM(S): at 13:46

## 2018-04-21 RX ADMIN — OXYCODONE HYDROCHLORIDE 10 MILLIGRAM(S): 5 TABLET ORAL at 17:36

## 2018-04-21 RX ADMIN — Medication 0.25 MILLIGRAM(S): at 06:11

## 2018-04-21 RX ADMIN — Medication 3 MILLILITER(S): at 00:20

## 2018-04-21 RX ADMIN — Medication 0.25 MILLIGRAM(S): at 19:42

## 2018-04-21 NOTE — PROGRESS NOTE ADULT - SUBJECTIVE AND OBJECTIVE BOX
VITAL SIGNS    Telemetry:  rsr 60-70  Vital Signs Last 24 Hrs  T(C): 36.5 (18 @ 13:40), Max: 36.7 (18 @ 19:32)  T(F): 97.7 (18 @ 13:40), Max: 98.1 (18 @ 19:32)  HR: 71 (18 @ 13:40) (68 - 75)  BP: 114/62 (18 @ 13:40) (114/62 - 131/65)  RR: 18 (18 @ 13:40) (17 - 18)  SpO2: 90% (18 @ 13:40) (90% - 94%)             @ 07:01  -   @ 07:00  --------------------------------------------------------  IN: 1160 mL / OUT: 4575 mL / NET: -3415 mL     @ 07:01  -   @ 13:55  --------------------------------------------------------  IN: 120 mL / OUT: 1700 mL / NET: -1580 mL       Daily     Daily Weight in k.9 (2018 08:33)  Admit Wt: Drug Dosing Weight  Height (cm): 177.8 (2018 03:50)  Weight (kg): 95 (2018 03:50)  BMI (kg/m2): 30.1 (2018 03:50)  BSA (m2): 2.13 (2018 03:50)         acetaminophen   Tablet. 650 milliGRAM(s) Oral every 6 hours PRN  ALBUTerol/ipratropium for Nebulization 3 milliLiter(s) Nebulizer every 6 hours  ALPRAZolam 0.25 milliGRAM(s) Oral every 12 hours  aspirin enteric coated 325 milliGRAM(s) Oral daily  buDESOnide   0.5 milliGRAM(s) Respule 0.5 milliGRAM(s) Inhalation every 12 hours  cloNIDine 0.1 milliGRAM(s) Oral every 8 hours  docusate sodium 100 milliGRAM(s) Oral three times a day  enoxaparin Injectable 40 milliGRAM(s) SubCutaneous every 12 hours  fluticasone propionate 50 MICROgram(s)/spray Nasal Spray 1 Spray(s) Both Nostrils two times a day  labetalol 600 milliGRAM(s) Oral every 8 hours  loratadine 10 milliGRAM(s) Oral daily  oxyCODONE    IR 5 milliGRAM(s) Oral every 3 hours PRN  oxyCODONE    IR 10 milliGRAM(s) Oral every 3 hours PRN  pantoprazole    Tablet 40 milliGRAM(s) Oral before breakfast  warfarin 7.5 milliGRAM(s) Oral once      PHYSICAL EXAM    Subjective: "I'm just waiting to get out of here."   Neurology: alert and oriented x 3, nonfocal, no gross deficits  CV : tele:  RSR 60-70  Sternal Wound :  CDI TATIANNA- sternum stable   Lungs: clear. RR easy, unlabored   Abdomen: soft, nontender, nondistended, positive bowel sounds,+  bowel movement   Neg N/V/D   :  pt voiding without difficulty   Extremities:   SAEED; neg LE edema, neg calf tenderness.   PPP bilaterally      PW:  none   Chest tubes: none

## 2018-04-21 NOTE — PROGRESS NOTE ADULT - PROVIDER SPECIALTY LIST ADULT
Anesthesia
CT Surgery
Cardiology
Cardiology
Critical Care
Critical Care
Vascular Surgery
Vascular Surgery
Critical Care
Anesthesia
CT Surgery
CT Surgery
Cardiology

## 2018-04-21 NOTE — PROGRESS NOTE ADULT - ATTENDING COMMENTS
Lalo Phillips M.D.  [phone]
Lalo Phillips M.D.  [phone]
Lalo Phillips M.D.  [phone]

## 2018-04-21 NOTE — PROGRESS NOTE ADULT - ASSESSMENT
37yMale with PMHx of:  HTN  HLD  Aortic Aneurysm, Type A Dissection, s/p Bio Bentall AVR #27 on 4/11/18    Dissection through right CCA with noted thrombus.  Per Vasc surg attg, needs repeat Carotid evaluation (by CTA?) in 6 weeks  A/C with coumadin, requiring future INR levels to be checked and ensure med compliance as outpt.    Pt is currently being bridged to coumadin with Lovenox injections.  Dose coumadin 10mg tonight and D/C pt when INR is at minimum 1.7    Otherwise, he is stable from CV perspective with Dispo plans to home upon D/C      Smoking Cessation discussion continued and will continue upon f/u in the office with Dr. Phillips.

## 2018-04-21 NOTE — PROGRESS NOTE ADULT - PROBLEM SELECTOR PROBLEM 1
S/P aortic dissection repair

## 2018-04-21 NOTE — PROGRESS NOTE ADULT - ASSESSMENT
As per [facility] EMR: 36yo male with pmh of HTN no current medications,  every day smoker presents with chest pain for 2 hours. Pt states 2 hours ago he was working out then took a hot shower and started to have severe chest pain, radiating down the abdomen with nausea and diaphoresis.  On CTA, found to have: Ascending aortic aneurysm measuring 6 cm. 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 There is extension into the brachiocephalic artery with nonopacification of the right common carotid artery concerning for acute occlusion. There is also extension into the proximal portion of the left subclavian artery. Dissection extends down into the descending aorta and ends at the level of the renal arteries. There is extension of the dissection into the celiac and superior mesenteric artery. 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 The inferior mesenteric artery is patent.  Transferred to [facility] for emergent surgical repair.   04/11/2018  Bio Bentall  AVR #27  w/ 34 hemoshield graft   + inotropes  extubated d 1  Vascular consult for L  hand numbness: Patient is seen He  is post op  Type  A dissection repair , complaining of left  hand finger numbness. CTA and US  reviewed   dissection flap in the right CCA extending into the ICA ,Thrombus  seen  . Recommend  anticoagulation with  Heparin  if possible    Post op hypoxemia, required bipap and high flow oxygen, on 100% titrated down.    hypertension,  Anticoagulation started for carotid thrombus  4/17 pt for transfer to floor.  BP well controlled   4/18 dressing removed  pacing wires d/c INR 1.25 coumadin tonight shower today  disposition to home in am  4/19 INR 1.24 ECHO / CXR for today overnight patient left hospital to smoke -offered tobacco cessation materials/ nicotine patient refused.  coumadin 5 tonight disposition to home when INR theatric  4/20 INR 1.38 - coumadin 10 mg this evening; Dr. Kareem Phillips to follow Coumadin levels as an outpatient- discharge planning home when INR 1.7 or greater   4/21 VSS; INR 1.45- coumadin 7.5 mg this evening; Discharge planning- home when INR > 1.7

## 2018-04-21 NOTE — PROGRESS NOTE ADULT - SUBJECTIVE AND OBJECTIVE BOX
COVERING FOR DR. CRUZ    Subjective: Patient seen and examined. No new events except as noted.   resting comfortably in bed     REVIEW OF SYSTEMS:    CONSTITUTIONAL: + weakness, fevers or chills  EYES/ENT: No visual changes;  No vertigo or throat pain   NECK: No pain or stiffness  RESPIRATORY: No cough, wheezing, hemoptysis; No shortness of breath  CARDIOVASCULAR: + chest pain or palpitations  GASTROINTESTINAL: No abdominal or epigastric pain. No nausea, vomiting, or hematemesis; No diarrhea or constipation. No melena or hematochezia.  GENITOURINARY: No dysuria, frequency or hematuria  NEUROLOGICAL: No numbness or weakness  SKIN: No itching, burning, rashes, or lesions   All other review of systems is negative unless indicated above.    MEDICATIONS:  MEDICATIONS  (STANDING):  ALBUTerol/ipratropium for Nebulization 3 milliLiter(s) Nebulizer every 6 hours  ALPRAZolam 0.25 milliGRAM(s) Oral every 12 hours  aspirin enteric coated 325 milliGRAM(s) Oral daily  buDESOnide   0.5 milliGRAM(s) Respule 0.5 milliGRAM(s) Inhalation every 12 hours  cloNIDine 0.1 milliGRAM(s) Oral every 8 hours  docusate sodium 100 milliGRAM(s) Oral three times a day  enoxaparin Injectable 40 milliGRAM(s) SubCutaneous every 12 hours  fluticasone propionate 50 MICROgram(s)/spray Nasal Spray 1 Spray(s) Both Nostrils two times a day  labetalol 600 milliGRAM(s) Oral every 8 hours  loratadine 10 milliGRAM(s) Oral daily  pantoprazole    Tablet 40 milliGRAM(s) Oral before breakfast      PHYSICAL EXAM:  T(C): 36.7 (04-21-18 @ 20:22), Max: 36.7 (04-21-18 @ 20:22)  HR: 66 (04-21-18 @ 20:22) (66 - 71)  BP: 118/67 (04-21-18 @ 20:22) (114/62 - 123/82)  RR: 18 (04-21-18 @ 20:22) (17 - 18)  SpO2: 90% (04-21-18 @ 20:22) (90% - 94%)  Wt(kg): --  I&O's Summary    20 Apr 2018 07:01  -  21 Apr 2018 07:00  --------------------------------------------------------  IN: 1160 mL / OUT: 4575 mL / NET: -3415 mL    21 Apr 2018 07:01  -  21 Apr 2018 22:33  --------------------------------------------------------  IN: 360 mL / OUT: 2200 mL / NET: -1840 mL          Appearance: Normal	  HEENT:   Normal oral mucosa, PERRL, EOMI	  Lymphatic: No lymphadenopathy , no edema  Cardiovascular: Normal S1 S2, No JVD, No murmurs , Peripheral pulses palpable 2+ bilaterally  Respiratory: Lungs clear to auscultation, normal effort 	  Gastrointestinal:  Soft, Non-tender, + BS	  Skin: No rashes, No ecchymoses, No cyanosis, warm to touch  Musculoskeletal: Normal range of motion, normal strength  Psychiatry:  Mood & affect appropriate  Ext: No edema      LABS:    CARDIAC MARKERS:                                10.7   9.6   )-----------( 238      ( 21 Apr 2018 06:37 )             30.5     04-21    140  |  100  |  14  ----------------------------<  97  4.3   |  25  |  1.10    Ca    9.3      21 Apr 2018 06:37  Phos  4.9     04-20      proBNP:   Lipid Profile:   HgA1c:   TSH:             TELEMETRY: 	  SR  ECG:  	  RADIOLOGY:   DIAGNOSTIC TESTING:  [ ] Echocardiogram:  [ ]  Catheterization:  [ ] Stress Test:    OTHER:

## 2018-04-21 NOTE — PROGRESS NOTE ADULT - PROBLEM SELECTOR PLAN 1
continue postop care   BP control with labetolol and clonidine   ambulate  tid incentive spirometry  shower daily    Coumadin dosed by INR 1.4 today- coumadin 7.5 tonight   Smoking cessation education  disposition to home when INR therapeutic sun

## 2018-04-22 VITALS
SYSTOLIC BLOOD PRESSURE: 103 MMHG | RESPIRATION RATE: 18 BRPM | DIASTOLIC BLOOD PRESSURE: 61 MMHG | OXYGEN SATURATION: 92 % | TEMPERATURE: 98 F | HEART RATE: 76 BPM

## 2018-04-22 LAB
ANION GAP SERPL CALC-SCNC: 16 MMOL/L — SIGNIFICANT CHANGE UP (ref 5–17)
BUN SERPL-MCNC: 16 MG/DL — SIGNIFICANT CHANGE UP (ref 7–23)
CALCIUM SERPL-MCNC: 9.9 MG/DL — SIGNIFICANT CHANGE UP (ref 8.4–10.5)
CHLORIDE SERPL-SCNC: 98 MMOL/L — SIGNIFICANT CHANGE UP (ref 96–108)
CO2 SERPL-SCNC: 26 MMOL/L — SIGNIFICANT CHANGE UP (ref 22–31)
CREAT SERPL-MCNC: 1.08 MG/DL — SIGNIFICANT CHANGE UP (ref 0.5–1.3)
GLUCOSE SERPL-MCNC: 117 MG/DL — HIGH (ref 70–99)
HCT VFR BLD CALC: 31.2 % — LOW (ref 39–50)
HGB BLD-MCNC: 10.8 G/DL — LOW (ref 13–17)
INR BLD: 1.81 RATIO — HIGH (ref 0.88–1.16)
MCHC RBC-ENTMCNC: 32.6 PG — SIGNIFICANT CHANGE UP (ref 27–34)
MCHC RBC-ENTMCNC: 34.7 GM/DL — SIGNIFICANT CHANGE UP (ref 32–36)
MCV RBC AUTO: 93.8 FL — SIGNIFICANT CHANGE UP (ref 80–100)
PLATELET # BLD AUTO: 264 K/UL — SIGNIFICANT CHANGE UP (ref 150–400)
POTASSIUM SERPL-MCNC: 4.1 MMOL/L — SIGNIFICANT CHANGE UP (ref 3.5–5.3)
POTASSIUM SERPL-SCNC: 4.1 MMOL/L — SIGNIFICANT CHANGE UP (ref 3.5–5.3)
PROTHROM AB SERPL-ACNC: 19.8 SEC — HIGH (ref 9.8–12.7)
RBC # BLD: 3.32 M/UL — LOW (ref 4.2–5.8)
RBC # FLD: 12 % — SIGNIFICANT CHANGE UP (ref 10.3–14.5)
SODIUM SERPL-SCNC: 140 MMOL/L — SIGNIFICANT CHANGE UP (ref 135–145)
WBC # BLD: 10 K/UL — SIGNIFICANT CHANGE UP (ref 3.8–10.5)
WBC # FLD AUTO: 10 K/UL — SIGNIFICANT CHANGE UP (ref 3.8–10.5)

## 2018-04-22 PROCEDURE — C1768: CPT

## 2018-04-22 PROCEDURE — 85730 THROMBOPLASTIN TIME PARTIAL: CPT

## 2018-04-22 PROCEDURE — 31720 CLEARANCE OF AIRWAYS: CPT

## 2018-04-22 PROCEDURE — 97116 GAIT TRAINING THERAPY: CPT

## 2018-04-22 PROCEDURE — 86923 COMPATIBILITY TEST ELECTRIC: CPT

## 2018-04-22 PROCEDURE — P9047: CPT

## 2018-04-22 PROCEDURE — P9016: CPT

## 2018-04-22 PROCEDURE — 82962 GLUCOSE BLOOD TEST: CPT

## 2018-04-22 PROCEDURE — 84484 ASSAY OF TROPONIN QUANT: CPT

## 2018-04-22 PROCEDURE — 83690 ASSAY OF LIPASE: CPT

## 2018-04-22 PROCEDURE — 83036 HEMOGLOBIN GLYCOSYLATED A1C: CPT

## 2018-04-22 PROCEDURE — 86022 PLATELET ANTIBODIES: CPT

## 2018-04-22 PROCEDURE — 94640 AIRWAY INHALATION TREATMENT: CPT

## 2018-04-22 PROCEDURE — 86901 BLOOD TYPING SEROLOGIC RH(D): CPT

## 2018-04-22 PROCEDURE — 86891 AUTOLOGOUS BLOOD OP SALVAGE: CPT

## 2018-04-22 PROCEDURE — 80053 COMPREHEN METABOLIC PANEL: CPT

## 2018-04-22 PROCEDURE — 86900 BLOOD TYPING SEROLOGIC ABO: CPT

## 2018-04-22 PROCEDURE — 83605 ASSAY OF LACTIC ACID: CPT

## 2018-04-22 PROCEDURE — 83735 ASSAY OF MAGNESIUM: CPT

## 2018-04-22 PROCEDURE — 84480 ASSAY TRIIODOTHYRONINE (T3): CPT

## 2018-04-22 PROCEDURE — 84443 ASSAY THYROID STIM HORMONE: CPT

## 2018-04-22 PROCEDURE — P9041: CPT

## 2018-04-22 PROCEDURE — 71045 X-RAY EXAM CHEST 1 VIEW: CPT

## 2018-04-22 PROCEDURE — 36430 TRANSFUSION BLD/BLD COMPNT: CPT

## 2018-04-22 PROCEDURE — 82330 ASSAY OF CALCIUM: CPT

## 2018-04-22 PROCEDURE — 82553 CREATINE MB FRACTION: CPT

## 2018-04-22 PROCEDURE — 82565 ASSAY OF CREATININE: CPT

## 2018-04-22 PROCEDURE — P9037: CPT

## 2018-04-22 PROCEDURE — 71046 X-RAY EXAM CHEST 2 VIEWS: CPT

## 2018-04-22 PROCEDURE — 85027 COMPLETE CBC AUTOMATED: CPT

## 2018-04-22 PROCEDURE — 93880 EXTRACRANIAL BILAT STUDY: CPT

## 2018-04-22 PROCEDURE — 88311 DECALCIFY TISSUE: CPT

## 2018-04-22 PROCEDURE — 82150 ASSAY OF AMYLASE: CPT

## 2018-04-22 PROCEDURE — 82435 ASSAY OF BLOOD CHLORIDE: CPT

## 2018-04-22 PROCEDURE — C1751: CPT

## 2018-04-22 PROCEDURE — 88305 TISSUE EXAM BY PATHOLOGIST: CPT

## 2018-04-22 PROCEDURE — 97162 PT EVAL MOD COMPLEX 30 MIN: CPT

## 2018-04-22 PROCEDURE — 82947 ASSAY GLUCOSE BLOOD QUANT: CPT

## 2018-04-22 PROCEDURE — 84436 ASSAY OF TOTAL THYROXINE: CPT

## 2018-04-22 PROCEDURE — 85014 HEMATOCRIT: CPT

## 2018-04-22 PROCEDURE — 86850 RBC ANTIBODY SCREEN: CPT

## 2018-04-22 PROCEDURE — 84100 ASSAY OF PHOSPHORUS: CPT

## 2018-04-22 PROCEDURE — 85610 PROTHROMBIN TIME: CPT

## 2018-04-22 PROCEDURE — 93306 TTE W/DOPPLER COMPLETE: CPT

## 2018-04-22 PROCEDURE — 82803 BLOOD GASES ANY COMBINATION: CPT

## 2018-04-22 PROCEDURE — 94660 CPAP INITIATION&MGMT: CPT

## 2018-04-22 PROCEDURE — P9012: CPT

## 2018-04-22 PROCEDURE — 80048 BASIC METABOLIC PNL TOTAL CA: CPT

## 2018-04-22 PROCEDURE — 84132 ASSAY OF SERUM POTASSIUM: CPT

## 2018-04-22 PROCEDURE — 94002 VENT MGMT INPAT INIT DAY: CPT

## 2018-04-22 PROCEDURE — 84295 ASSAY OF SERUM SODIUM: CPT

## 2018-04-22 PROCEDURE — C1889: CPT

## 2018-04-22 PROCEDURE — 85384 FIBRINOGEN ACTIVITY: CPT

## 2018-04-22 PROCEDURE — 93005 ELECTROCARDIOGRAM TRACING: CPT

## 2018-04-22 PROCEDURE — 82550 ASSAY OF CK (CPK): CPT

## 2018-04-22 RX ORDER — ACETAMINOPHEN 500 MG
2 TABLET ORAL
Qty: 0 | Refills: 0 | COMMUNITY
Start: 2018-04-22

## 2018-04-22 RX ORDER — FLUTICASONE PROPIONATE 50 MCG
1 SPRAY, SUSPENSION NASAL
Qty: 1 | Refills: 0 | OUTPATIENT
Start: 2018-04-22 | End: 2018-05-05

## 2018-04-22 RX ORDER — LORATADINE 10 MG/1
1 TABLET ORAL
Qty: 0 | Refills: 0 | COMMUNITY
Start: 2018-04-22

## 2018-04-22 RX ORDER — WARFARIN SODIUM 2.5 MG/1
1 TABLET ORAL
Qty: 30 | Refills: 0 | OUTPATIENT
Start: 2018-04-22 | End: 2018-05-21

## 2018-04-22 RX ORDER — WARFARIN SODIUM 2.5 MG/1
5 TABLET ORAL
Qty: 30 | Refills: 0 | OUTPATIENT
Start: 2018-04-22 | End: 2018-05-21

## 2018-04-22 RX ORDER — BUDESONIDE, MICRONIZED 100 %
1 POWDER (GRAM) MISCELLANEOUS
Qty: 1 | Refills: 0 | OUTPATIENT
Start: 2018-04-22 | End: 2018-05-05

## 2018-04-22 RX ORDER — OXYCODONE HYDROCHLORIDE 5 MG/1
1 TABLET ORAL
Qty: 20 | Refills: 0 | OUTPATIENT
Start: 2018-04-22 | End: 2018-04-26

## 2018-04-22 RX ORDER — ALPRAZOLAM 0.25 MG
0.25 TABLET ORAL
Qty: 15 | Refills: 0 | OUTPATIENT
Start: 2018-04-22 | End: 2018-05-21

## 2018-04-22 RX ORDER — DOCUSATE SODIUM 100 MG
1 CAPSULE ORAL
Qty: 90 | Refills: 0 | OUTPATIENT
Start: 2018-04-22 | End: 2018-05-21

## 2018-04-22 RX ORDER — ASPIRIN/CALCIUM CARB/MAGNESIUM 324 MG
1 TABLET ORAL
Qty: 30 | Refills: 0 | OUTPATIENT
Start: 2018-04-22 | End: 2018-05-21

## 2018-04-22 RX ORDER — PANTOPRAZOLE SODIUM 20 MG/1
1 TABLET, DELAYED RELEASE ORAL
Qty: 30 | Refills: 0 | OUTPATIENT
Start: 2018-04-22 | End: 2018-05-21

## 2018-04-22 RX ORDER — LABETALOL HCL 100 MG
2 TABLET ORAL
Qty: 180 | Refills: 0 | OUTPATIENT
Start: 2018-04-22 | End: 2018-05-21

## 2018-04-22 RX ADMIN — OXYCODONE HYDROCHLORIDE 10 MILLIGRAM(S): 5 TABLET ORAL at 06:44

## 2018-04-22 RX ADMIN — Medication 3 MILLILITER(S): at 06:06

## 2018-04-22 RX ADMIN — Medication 325 MILLIGRAM(S): at 12:50

## 2018-04-22 RX ADMIN — Medication 0.1 MILLIGRAM(S): at 12:52

## 2018-04-22 RX ADMIN — Medication 3 MILLILITER(S): at 00:42

## 2018-04-22 RX ADMIN — OXYCODONE HYDROCHLORIDE 10 MILLIGRAM(S): 5 TABLET ORAL at 00:42

## 2018-04-22 RX ADMIN — LORATADINE 10 MILLIGRAM(S): 10 TABLET ORAL at 12:50

## 2018-04-22 RX ADMIN — Medication 600 MILLIGRAM(S): at 12:51

## 2018-04-22 RX ADMIN — OXYCODONE HYDROCHLORIDE 10 MILLIGRAM(S): 5 TABLET ORAL at 12:55

## 2018-04-22 RX ADMIN — Medication 0.5 MILLIGRAM(S): at 06:07

## 2018-04-22 RX ADMIN — Medication 0.1 MILLIGRAM(S): at 06:08

## 2018-04-22 RX ADMIN — OXYCODONE HYDROCHLORIDE 10 MILLIGRAM(S): 5 TABLET ORAL at 01:12

## 2018-04-22 RX ADMIN — Medication 100 MILLIGRAM(S): at 06:08

## 2018-04-22 RX ADMIN — Medication 1 SPRAY(S): at 06:07

## 2018-04-22 RX ADMIN — PANTOPRAZOLE SODIUM 40 MILLIGRAM(S): 20 TABLET, DELAYED RELEASE ORAL at 06:08

## 2018-04-22 RX ADMIN — Medication 0.25 MILLIGRAM(S): at 06:12

## 2018-04-22 RX ADMIN — ENOXAPARIN SODIUM 40 MILLIGRAM(S): 100 INJECTION SUBCUTANEOUS at 06:12

## 2018-04-22 RX ADMIN — Medication 600 MILLIGRAM(S): at 06:08

## 2018-04-22 RX ADMIN — OXYCODONE HYDROCHLORIDE 10 MILLIGRAM(S): 5 TABLET ORAL at 06:12

## 2018-05-02 RX ORDER — BUDESONIDE 0.5 MG/2ML
0.5 INHALANT ORAL
Refills: 0 | Status: ACTIVE | COMMUNITY

## 2018-05-02 RX ORDER — LORATADINE 10 MG/1
10 TABLET ORAL
Refills: 0 | Status: ACTIVE | COMMUNITY

## 2018-05-03 ENCOUNTER — APPOINTMENT (OUTPATIENT)
Dept: CARDIOTHORACIC SURGERY | Facility: CLINIC | Age: 38
End: 2018-05-03

## 2018-05-03 VITALS
DIASTOLIC BLOOD PRESSURE: 83 MMHG | TEMPERATURE: 98.8 F | SYSTOLIC BLOOD PRESSURE: 124 MMHG | OXYGEN SATURATION: 98 % | HEART RATE: 78 BPM | RESPIRATION RATE: 13 BRPM

## 2018-05-03 RX ORDER — WARFARIN SODIUM 7.5 MG/1
7.5 TABLET ORAL DAILY
Qty: 14 | Refills: 0 | Status: ACTIVE | COMMUNITY

## 2018-05-03 RX ORDER — LABETALOL HYDROCHLORIDE 300 MG/1
300 TABLET, FILM COATED ORAL
Qty: 120 | Refills: 0 | Status: ACTIVE | COMMUNITY

## 2018-05-03 RX ORDER — DOCUSATE SODIUM 100 MG/1
100 CAPSULE, LIQUID FILLED ORAL
Qty: 90 | Refills: 3 | Status: ACTIVE | COMMUNITY

## 2018-05-03 RX ORDER — ALPRAZOLAM 0.25 MG/1
0.25 TABLET ORAL
Refills: 0 | Status: ACTIVE | COMMUNITY

## 2018-05-03 RX ORDER — CLONIDINE HYDROCHLORIDE 0.1 MG/1
0.1 TABLET ORAL TWICE DAILY
Qty: 60 | Refills: 0 | Status: ACTIVE | COMMUNITY

## 2018-05-03 RX ORDER — FLUTICASONE PROPIONATE 50 UG/1
50 SPRAY, METERED NASAL TWICE DAILY
Refills: 0 | Status: ACTIVE | COMMUNITY

## 2018-05-03 RX ORDER — PANTOPRAZOLE SODIUM 40 MG/1
40 TABLET, DELAYED RELEASE ORAL DAILY
Qty: 30 | Refills: 0 | Status: ACTIVE | COMMUNITY

## 2018-06-30 NOTE — PHYSICAL THERAPY INITIAL EVALUATION ADULT - ADDITIONAL COMMENTS
Stable
Pt lives alone in an apt with one flight of steps to enter. Pt was Ind with all ADLs and amb without AD.

## 2018-07-13 ENCOUNTER — CLINICAL ADVICE (OUTPATIENT)
Age: 38
End: 2018-07-13

## 2018-07-23 PROBLEM — I10 ESSENTIAL (PRIMARY) HYPERTENSION: Chronic | Status: ACTIVE | Noted: 2018-04-11

## 2018-07-25 ENCOUNTER — OUTPATIENT (OUTPATIENT)
Dept: OUTPATIENT SERVICES | Facility: HOSPITAL | Age: 38
LOS: 1 days | End: 2018-07-25
Payer: COMMERCIAL

## 2018-07-25 ENCOUNTER — APPOINTMENT (OUTPATIENT)
Dept: CT IMAGING | Facility: IMAGING CENTER | Age: 38
End: 2018-07-25
Payer: COMMERCIAL

## 2018-07-25 DIAGNOSIS — Z98.890 OTHER SPECIFIED POSTPROCEDURAL STATES: ICD-10-CM

## 2018-07-25 PROCEDURE — 71275 CT ANGIOGRAPHY CHEST: CPT | Mod: 26

## 2018-07-25 PROCEDURE — 82565 ASSAY OF CREATININE: CPT

## 2018-07-25 PROCEDURE — 71275 CT ANGIOGRAPHY CHEST: CPT

## 2018-08-10 ENCOUNTER — APPOINTMENT (OUTPATIENT)
Dept: CARDIOTHORACIC SURGERY | Facility: CLINIC | Age: 38
End: 2018-08-10
Payer: COMMERCIAL

## 2018-08-10 VITALS
DIASTOLIC BLOOD PRESSURE: 83 MMHG | TEMPERATURE: 98 F | WEIGHT: 207 LBS | SYSTOLIC BLOOD PRESSURE: 138 MMHG | HEART RATE: 69 BPM | RESPIRATION RATE: 13 BRPM | HEIGHT: 70 IN | BODY MASS INDEX: 29.63 KG/M2 | OXYGEN SATURATION: 98 %

## 2018-08-10 VITALS — SYSTOLIC BLOOD PRESSURE: 124 MMHG | DIASTOLIC BLOOD PRESSURE: 80 MMHG

## 2018-08-10 DIAGNOSIS — F17.200 NICOTINE DEPENDENCE, UNSPECIFIED, UNCOMPLICATED: ICD-10-CM

## 2018-08-10 DIAGNOSIS — Z95.3 PRESENCE OF XENOGENIC HEART VALVE: ICD-10-CM

## 2018-08-10 DIAGNOSIS — Z98.890 OTHER SPECIFIED POSTPROCEDURAL STATES: ICD-10-CM

## 2018-08-10 PROCEDURE — 99213 OFFICE O/P EST LOW 20 MIN: CPT

## 2018-08-10 RX ORDER — ASPIRIN 325 MG/1
325 TABLET, COATED ORAL
Refills: 0 | Status: COMPLETED | COMMUNITY
End: 2018-08-10

## 2018-08-10 RX ORDER — EZETIMIBE 10 MG/1
10 TABLET ORAL
Refills: 0 | Status: ACTIVE | COMMUNITY
Start: 2018-08-10

## 2018-08-22 ENCOUNTER — OUTPATIENT (OUTPATIENT)
Dept: OUTPATIENT SERVICES | Facility: HOSPITAL | Age: 38
LOS: 1 days | End: 2018-08-22
Payer: COMMERCIAL

## 2018-08-22 ENCOUNTER — APPOINTMENT (OUTPATIENT)
Dept: ULTRASOUND IMAGING | Facility: HOSPITAL | Age: 38
End: 2018-08-22
Payer: COMMERCIAL

## 2018-08-22 DIAGNOSIS — Z00.00 ENCOUNTER FOR GENERAL ADULT MEDICAL EXAMINATION WITHOUT ABNORMAL FINDINGS: ICD-10-CM

## 2018-08-22 PROCEDURE — 93880 EXTRACRANIAL BILAT STUDY: CPT

## 2018-08-22 PROCEDURE — 93880 EXTRACRANIAL BILAT STUDY: CPT | Mod: 26

## 2019-09-20 ENCOUNTER — APPOINTMENT (OUTPATIENT)
Dept: CT IMAGING | Facility: IMAGING CENTER | Age: 39
End: 2019-09-20

## 2020-12-01 ENCOUNTER — RESULT REVIEW (OUTPATIENT)
Age: 40
End: 2020-12-01

## 2020-12-01 ENCOUNTER — APPOINTMENT (OUTPATIENT)
Dept: CT IMAGING | Facility: IMAGING CENTER | Age: 40
End: 2020-12-01
Payer: MEDICAID

## 2020-12-01 ENCOUNTER — OUTPATIENT (OUTPATIENT)
Dept: OUTPATIENT SERVICES | Facility: HOSPITAL | Age: 40
LOS: 1 days | End: 2020-12-01
Payer: MEDICAID

## 2020-12-01 DIAGNOSIS — Z95.3 PRESENCE OF XENOGENIC HEART VALVE: ICD-10-CM

## 2020-12-01 DIAGNOSIS — Z98.890 OTHER SPECIFIED POSTPROCEDURAL STATES: ICD-10-CM

## 2020-12-01 PROCEDURE — 71275 CT ANGIOGRAPHY CHEST: CPT

## 2020-12-01 PROCEDURE — 76377 3D RENDER W/INTRP POSTPROCES: CPT

## 2020-12-01 PROCEDURE — 71275 CT ANGIOGRAPHY CHEST: CPT | Mod: 26

## 2020-12-01 PROCEDURE — 82565 ASSAY OF CREATININE: CPT

## 2020-12-09 ENCOUNTER — OUTPATIENT (OUTPATIENT)
Dept: OUTPATIENT SERVICES | Facility: HOSPITAL | Age: 40
LOS: 1 days | End: 2020-12-09
Payer: MEDICAID

## 2020-12-09 ENCOUNTER — APPOINTMENT (OUTPATIENT)
Dept: RADIOLOGY | Facility: IMAGING CENTER | Age: 40
End: 2020-12-09
Payer: MEDICAID

## 2020-12-09 DIAGNOSIS — Z00.8 ENCOUNTER FOR OTHER GENERAL EXAMINATION: ICD-10-CM

## 2020-12-09 PROCEDURE — 73080 X-RAY EXAM OF ELBOW: CPT | Mod: 26,LT

## 2020-12-09 PROCEDURE — 73080 X-RAY EXAM OF ELBOW: CPT

## 2020-12-09 PROCEDURE — 73502 X-RAY EXAM HIP UNI 2-3 VIEWS: CPT | Mod: 26,LT

## 2020-12-09 PROCEDURE — 73502 X-RAY EXAM HIP UNI 2-3 VIEWS: CPT

## 2020-12-09 PROCEDURE — 72110 X-RAY EXAM L-2 SPINE 4/>VWS: CPT

## 2020-12-09 PROCEDURE — 72110 X-RAY EXAM L-2 SPINE 4/>VWS: CPT | Mod: 26

## 2020-12-10 ENCOUNTER — APPOINTMENT (OUTPATIENT)
Dept: CARDIOTHORACIC SURGERY | Facility: CLINIC | Age: 40
End: 2020-12-10

## 2021-04-06 ENCOUNTER — OUTPATIENT (OUTPATIENT)
Dept: OUTPATIENT SERVICES | Facility: HOSPITAL | Age: 41
LOS: 1 days | End: 2021-04-06
Payer: MEDICAID

## 2021-04-06 ENCOUNTER — APPOINTMENT (OUTPATIENT)
Dept: RADIOLOGY | Facility: IMAGING CENTER | Age: 41
End: 2021-04-06
Payer: MEDICAID

## 2021-04-06 DIAGNOSIS — M25.511 PAIN IN RIGHT SHOULDER: ICD-10-CM

## 2021-04-06 PROCEDURE — 73030 X-RAY EXAM OF SHOULDER: CPT | Mod: 26,RT

## 2021-04-06 PROCEDURE — 73030 X-RAY EXAM OF SHOULDER: CPT

## 2023-01-04 NOTE — H&P ADULT - NSHPLABSRESULTS_GEN_ALL_CORE
CTA: Ascending aortic aneurysm measuring 6 cm. 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 There is extension into the brachiocephalic artery with nonopacification of the right common carotid artery concerning for acute occlusion. There is also extension into the proximal portion of the left subclavian artery. Dissection extends down into the descending aorta and ends at the level of the renal arteries. There is extension of the dissection into the celiac and superior mesenteric artery. 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 The inferior mesenteric artery is patent. Never

## 2023-01-16 ENCOUNTER — RESULT REVIEW (OUTPATIENT)
Age: 43
End: 2023-01-16

## 2023-01-16 ENCOUNTER — APPOINTMENT (OUTPATIENT)
Dept: CT IMAGING | Facility: IMAGING CENTER | Age: 43
End: 2023-01-16
Payer: MEDICAID

## 2023-01-16 ENCOUNTER — OUTPATIENT (OUTPATIENT)
Dept: OUTPATIENT SERVICES | Facility: HOSPITAL | Age: 43
LOS: 1 days | End: 2023-01-16
Payer: MEDICAID

## 2023-01-16 DIAGNOSIS — Z98.890 OTHER SPECIFIED POSTPROCEDURAL STATES: ICD-10-CM

## 2023-01-16 DIAGNOSIS — Z95.3 PRESENCE OF XENOGENIC HEART VALVE: ICD-10-CM

## 2023-01-16 PROCEDURE — 71275 CT ANGIOGRAPHY CHEST: CPT

## 2023-01-16 PROCEDURE — 76376 3D RENDER W/INTRP POSTPROCES: CPT

## 2023-01-16 PROCEDURE — 71275 CT ANGIOGRAPHY CHEST: CPT | Mod: 26

## 2023-01-16 PROCEDURE — 82565 ASSAY OF CREATININE: CPT

## 2023-11-08 ENCOUNTER — APPOINTMENT (OUTPATIENT)
Dept: UROLOGY | Facility: CLINIC | Age: 43
End: 2023-11-08
Payer: MEDICAID

## 2023-11-08 VITALS
HEART RATE: 70 BPM | DIASTOLIC BLOOD PRESSURE: 96 MMHG | HEIGHT: 70 IN | OXYGEN SATURATION: 99 % | SYSTOLIC BLOOD PRESSURE: 165 MMHG

## 2023-11-08 DIAGNOSIS — Z78.9 OTHER SPECIFIED HEALTH STATUS: ICD-10-CM

## 2023-11-08 DIAGNOSIS — Z86.79 PERSONAL HISTORY OF OTHER DISEASES OF THE CIRCULATORY SYSTEM: ICD-10-CM

## 2023-11-08 DIAGNOSIS — R53.83 OTHER FATIGUE: ICD-10-CM

## 2023-11-08 PROCEDURE — 99204 OFFICE O/P NEW MOD 45 MIN: CPT

## 2024-03-22 ENCOUNTER — NON-APPOINTMENT (OUTPATIENT)
Age: 44
End: 2024-03-22

## 2024-03-25 LAB
FSH SERPL-MCNC: 3.6 IU/L
HCT VFR BLD CALC: 39.1 %
HGB BLD-MCNC: 14.1 G/DL
LH SERPL-ACNC: 6.8 IU/L
MCHC RBC-ENTMCNC: 31.2 PG
MCHC RBC-ENTMCNC: 36.1 GM/DL
MCV RBC AUTO: 86.5 FL
PLATELET # BLD AUTO: 179 K/UL
PROLACTIN SERPL-MCNC: 5.3 NG/ML
PSA SERPL-MCNC: 0.47 NG/ML
RBC # BLD: 4.52 M/UL
RBC # FLD: 12.9 %
TESTOST FREE SERPL-MCNC: 6.3 PG/ML
TESTOST SERPL-MCNC: 332 NG/DL
WBC # FLD AUTO: 9.48 K/UL